# Patient Record
Sex: MALE | Race: WHITE | NOT HISPANIC OR LATINO | Employment: FULL TIME | URBAN - METROPOLITAN AREA
[De-identification: names, ages, dates, MRNs, and addresses within clinical notes are randomized per-mention and may not be internally consistent; named-entity substitution may affect disease eponyms.]

---

## 2023-03-04 PROBLEM — E66.811 CLASS 1 OBESITY WITH BODY MASS INDEX (BMI) OF 33.0 TO 33.9 IN ADULT: Status: ACTIVE | Noted: 2023-03-04

## 2023-03-04 PROBLEM — I10 ESSENTIAL HYPERTENSION: Status: ACTIVE | Noted: 2023-03-04

## 2023-03-04 PROBLEM — U07.1 COVID-19: Status: ACTIVE | Noted: 2023-03-04

## 2023-03-04 PROBLEM — J20.8 ACUTE BACTERIAL BRONCHITIS: Status: ACTIVE | Noted: 2023-03-04

## 2023-03-04 PROBLEM — R07.89 CHEST WALL PAIN: Status: ACTIVE | Noted: 2023-03-04

## 2023-03-04 PROBLEM — K64.9 HEMORRHOID: Status: ACTIVE | Noted: 2023-03-04

## 2023-03-04 PROBLEM — B02.9 SHINGLES: Status: ACTIVE | Noted: 2023-03-04

## 2023-03-04 PROBLEM — M79.646 FINGER PAIN: Status: ACTIVE | Noted: 2023-03-04

## 2023-03-04 PROBLEM — B96.89 ACUTE BACTERIAL BRONCHITIS: Status: ACTIVE | Noted: 2023-03-04

## 2023-03-04 PROBLEM — K21.9 GERD (GASTROESOPHAGEAL REFLUX DISEASE): Status: ACTIVE | Noted: 2023-03-04

## 2023-03-04 PROBLEM — R16.1 SPLENOMEGALY: Status: ACTIVE | Noted: 2023-03-04

## 2023-03-04 PROBLEM — E66.9 CLASS 1 OBESITY WITH BODY MASS INDEX (BMI) OF 33.0 TO 33.9 IN ADULT: Status: ACTIVE | Noted: 2023-03-04

## 2023-03-04 PROBLEM — J02.9 THROAT INFECTION: Status: ACTIVE | Noted: 2023-03-04

## 2023-03-04 PROBLEM — R43.2 LOSS OF TASTE: Status: ACTIVE | Noted: 2023-03-04

## 2023-03-04 PROBLEM — I71.20 THORACIC AORTIC ANEURYSM (CMS-HCC): Status: ACTIVE | Noted: 2023-03-04

## 2023-03-04 PROBLEM — R05.9 COUGH: Status: ACTIVE | Noted: 2023-03-04

## 2023-03-04 PROBLEM — Q76.6 ABNORMALITY OF RIB DETERMINED BY X-RAY: Status: ACTIVE | Noted: 2023-03-04

## 2023-03-04 PROBLEM — J32.9 SINUSITIS: Status: ACTIVE | Noted: 2023-03-04

## 2023-03-04 PROBLEM — D69.6 THROMBOCYTOPENIA (CMS-HCC): Status: ACTIVE | Noted: 2023-03-04

## 2023-03-04 PROBLEM — R91.1 PULMONARY NODULE: Status: ACTIVE | Noted: 2023-03-04

## 2023-03-04 RX ORDER — BENZONATATE 200 MG/1
200 CAPSULE ORAL 3 TIMES DAILY PRN
COMMUNITY
End: 2023-12-22 | Stop reason: WASHOUT

## 2023-03-04 RX ORDER — ZINC GLUCONATE 100 MG
TABLET ORAL
COMMUNITY

## 2023-03-04 RX ORDER — AMLODIPINE BESYLATE 2.5 MG/1
2.5 TABLET ORAL DAILY
COMMUNITY
End: 2023-03-24 | Stop reason: SDUPTHER

## 2023-03-16 ENCOUNTER — TELEPHONE (OUTPATIENT)
Dept: PRIMARY CARE | Facility: CLINIC | Age: 58
End: 2023-03-16
Payer: COMMERCIAL

## 2023-03-16 DIAGNOSIS — Z00.00 ROUTINE GENERAL MEDICAL EXAMINATION AT A HEALTH CARE FACILITY: Primary | ICD-10-CM

## 2023-03-16 NOTE — TELEPHONE ENCOUNTER
Pt. Left message stating he was told to call a wk. Prior to appt. To call and request lab orders be placed. Please advise.

## 2023-03-17 ENCOUNTER — LAB (OUTPATIENT)
Dept: LAB | Facility: LAB | Age: 58
End: 2023-03-17
Payer: COMMERCIAL

## 2023-03-17 DIAGNOSIS — Z00.00 ROUTINE GENERAL MEDICAL EXAMINATION AT A HEALTH CARE FACILITY: ICD-10-CM

## 2023-03-17 LAB
ALANINE AMINOTRANSFERASE (SGPT) (U/L) IN SER/PLAS: 51 U/L (ref 10–52)
ANION GAP IN SER/PLAS: 11 MMOL/L (ref 10–20)
CALCIUM (MG/DL) IN SER/PLAS: 9 MG/DL (ref 8.6–10.3)
CARBON DIOXIDE, TOTAL (MMOL/L) IN SER/PLAS: 25 MMOL/L (ref 21–32)
CHLORIDE (MMOL/L) IN SER/PLAS: 105 MMOL/L (ref 98–107)
CHOLESTEROL (MG/DL) IN SER/PLAS: 187 MG/DL (ref 0–199)
CHOLESTEROL IN HDL (MG/DL) IN SER/PLAS: 41.8 MG/DL
CHOLESTEROL/HDL RATIO: 4.5
CREATININE (MG/DL) IN SER/PLAS: 0.89 MG/DL (ref 0.5–1.3)
ERYTHROCYTE DISTRIBUTION WIDTH (RATIO) BY AUTOMATED COUNT: 14.2 % (ref 11.5–14.5)
ERYTHROCYTE MEAN CORPUSCULAR HEMOGLOBIN CONCENTRATION (G/DL) BY AUTOMATED: 33.4 G/DL (ref 32–36)
ERYTHROCYTE MEAN CORPUSCULAR VOLUME (FL) BY AUTOMATED COUNT: 88 FL (ref 80–100)
ERYTHROCYTES (10*6/UL) IN BLOOD BY AUTOMATED COUNT: 5.31 X10E12/L (ref 4.5–5.9)
GFR MALE: >90 ML/MIN/1.73M2
GLUCOSE (MG/DL) IN SER/PLAS: 93 MG/DL (ref 74–99)
HEMATOCRIT (%) IN BLOOD BY AUTOMATED COUNT: 46.7 % (ref 41–52)
HEMOGLOBIN (G/DL) IN BLOOD: 15.6 G/DL (ref 13.5–17.5)
HEMOGLOBIN A1C/HEMOGLOBIN TOTAL IN BLOOD: 4.9 %
LDL: 124 MG/DL (ref 0–99)
LEUKOCYTES (10*3/UL) IN BLOOD BY AUTOMATED COUNT: 8.7 X10E9/L (ref 4.4–11.3)
PLATELETS (10*3/UL) IN BLOOD AUTOMATED COUNT: 145 X10E9/L (ref 150–450)
POTASSIUM (MMOL/L) IN SER/PLAS: 4.3 MMOL/L (ref 3.5–5.3)
PROSTATE SPECIFIC AG (NG/ML) IN SER/PLAS: 0.4 NG/ML (ref 0–4)
SODIUM (MMOL/L) IN SER/PLAS: 137 MMOL/L (ref 136–145)
TRIGLYCERIDE (MG/DL) IN SER/PLAS: 107 MG/DL (ref 0–149)
UREA NITROGEN (MG/DL) IN SER/PLAS: 16 MG/DL (ref 6–23)
VLDL: 21 MG/DL (ref 0–40)

## 2023-03-17 PROCEDURE — 80061 LIPID PANEL: CPT

## 2023-03-17 PROCEDURE — 85027 COMPLETE CBC AUTOMATED: CPT

## 2023-03-17 PROCEDURE — 84153 ASSAY OF PSA TOTAL: CPT

## 2023-03-17 PROCEDURE — 36415 COLL VENOUS BLD VENIPUNCTURE: CPT

## 2023-03-17 PROCEDURE — 84460 ALANINE AMINO (ALT) (SGPT): CPT

## 2023-03-17 PROCEDURE — 83036 HEMOGLOBIN GLYCOSYLATED A1C: CPT

## 2023-03-17 PROCEDURE — 80048 BASIC METABOLIC PNL TOTAL CA: CPT

## 2023-03-17 ASSESSMENT — ENCOUNTER SYMPTOMS
PALPITATIONS: 0
NECK PAIN: 1
ORTHOPNEA: 0
SWEATS: 1
BLURRED VISION: 0
HEADACHES: 0
HYPERTENSION: 1
SHORTNESS OF BREATH: 0
PND: 0

## 2023-03-24 ENCOUNTER — OFFICE VISIT (OUTPATIENT)
Dept: PRIMARY CARE | Facility: CLINIC | Age: 58
End: 2023-03-24
Payer: COMMERCIAL

## 2023-03-24 ENCOUNTER — LAB (OUTPATIENT)
Dept: LAB | Facility: LAB | Age: 58
End: 2023-03-24
Payer: COMMERCIAL

## 2023-03-24 VITALS
SYSTOLIC BLOOD PRESSURE: 144 MMHG | TEMPERATURE: 98 F | WEIGHT: 264.8 LBS | BODY MASS INDEX: 33.1 KG/M2 | DIASTOLIC BLOOD PRESSURE: 90 MMHG

## 2023-03-24 DIAGNOSIS — G47.33 OSA (OBSTRUCTIVE SLEEP APNEA): ICD-10-CM

## 2023-03-24 DIAGNOSIS — R91.1 PULMONARY NODULE: ICD-10-CM

## 2023-03-24 DIAGNOSIS — K21.9 GASTROESOPHAGEAL REFLUX DISEASE, UNSPECIFIED WHETHER ESOPHAGITIS PRESENT: ICD-10-CM

## 2023-03-24 DIAGNOSIS — I10 ESSENTIAL HYPERTENSION: Primary | ICD-10-CM

## 2023-03-24 DIAGNOSIS — D69.6 THROMBOCYTOPENIA (CMS-HCC): ICD-10-CM

## 2023-03-24 DIAGNOSIS — K40.90 NON-RECURRENT UNILATERAL INGUINAL HERNIA WITHOUT OBSTRUCTION OR GANGRENE: ICD-10-CM

## 2023-03-24 DIAGNOSIS — E78.5 HYPERLIPIDEMIA, UNSPECIFIED HYPERLIPIDEMIA TYPE: ICD-10-CM

## 2023-03-24 DIAGNOSIS — I71.20 THORACIC AORTIC ANEURYSM WITHOUT RUPTURE, UNSPECIFIED PART (CMS-HCC): ICD-10-CM

## 2023-03-24 PROBLEM — B96.89 ACUTE BACTERIAL BRONCHITIS: Status: RESOLVED | Noted: 2023-03-04 | Resolved: 2023-03-24

## 2023-03-24 PROBLEM — J20.8 ACUTE BACTERIAL BRONCHITIS: Status: RESOLVED | Noted: 2023-03-04 | Resolved: 2023-03-24

## 2023-03-24 PROBLEM — R43.2 LOSS OF TASTE: Status: RESOLVED | Noted: 2023-03-04 | Resolved: 2023-03-24

## 2023-03-24 PROBLEM — R05.9 COUGH: Status: RESOLVED | Noted: 2023-03-04 | Resolved: 2023-03-24

## 2023-03-24 LAB — PLATELETS (10*3/UL) IN BLOOD AUTOMATED COUNT: 174 X10E9/L (ref 150–450)

## 2023-03-24 PROCEDURE — 36415 COLL VENOUS BLD VENIPUNCTURE: CPT

## 2023-03-24 PROCEDURE — 3080F DIAST BP >= 90 MM HG: CPT | Performed by: INTERNAL MEDICINE

## 2023-03-24 PROCEDURE — 1036F TOBACCO NON-USER: CPT | Performed by: INTERNAL MEDICINE

## 2023-03-24 PROCEDURE — 3077F SYST BP >= 140 MM HG: CPT | Performed by: INTERNAL MEDICINE

## 2023-03-24 PROCEDURE — 85049 AUTOMATED PLATELET COUNT: CPT

## 2023-03-24 PROCEDURE — 99214 OFFICE O/P EST MOD 30 MIN: CPT | Performed by: INTERNAL MEDICINE

## 2023-03-24 RX ORDER — ROSUVASTATIN CALCIUM 5 MG/1
5 TABLET, COATED ORAL DAILY
Qty: 90 TABLET | Refills: 3 | Status: SHIPPED | OUTPATIENT
Start: 2023-03-24 | End: 2023-06-26

## 2023-03-24 RX ORDER — AMLODIPINE BESYLATE 5 MG/1
5 TABLET ORAL DAILY
Qty: 90 TABLET | Refills: 3 | Status: SHIPPED | OUTPATIENT
Start: 2023-03-24 | End: 2023-06-26

## 2023-03-24 NOTE — PATIENT INSTRUCTIONS
Scheduled to see a surgeon increase amlodipine to 5 mg and begin crestor.  Please see the surgeon as we discussed.  Lets get back together in about 3 months

## 2023-03-24 NOTE — PROGRESS NOTES
Subjective   Patient ID: Wellington Meredith is a 57 y.o. male who presents for Follow-up (Blood work).    Hypertension  This is a recurrent problem. The current episode started more than 1 year ago. The problem has been waxing and waning since onset. The problem is controlled. Associated symptoms include malaise/fatigue, neck pain and sweats. Pertinent negatives include no anxiety, blurred vision, chest pain, headaches, orthopnea, palpitations, peripheral edema, PND or shortness of breath. Risk factors for coronary artery disease include family history, obesity and stress. There are no compliance problems.         Overall well   #1 c/o soreness, rt groin after increased coughing x a few weeks.  Increased w/ lifting.   #2 TAA  #3 HTN- at home 130s-150s.  Little exercise.  No CP,SOB    Review of Systems   Constitutional:  Positive for malaise/fatigue.   Eyes:  Negative for blurred vision.   Respiratory:  Negative for shortness of breath.    Cardiovascular:  Negative for chest pain, palpitations, orthopnea and PND.   Musculoskeletal:  Positive for neck pain.   Neurological:  Negative for headaches.   All other systems reviewed and are negative.      Objective   /90   Temp 36.7 °C (98 °F)   Wt 120 kg (264 lb 12.8 oz)   BMI 33.10 kg/m²     Physical Exam    Lab Results   Component Value Date    WBC 8.7 03/17/2023    HGB 15.6 03/17/2023    HCT 46.7 03/17/2023     03/24/2023    CHOL 187 03/17/2023    TRIG 107 03/17/2023    HDL 41.8 03/17/2023    ALT 51 03/17/2023    AST 20 09/05/2018     03/17/2023    K 4.3 03/17/2023     03/17/2023    CREATININE 0.89 03/17/2023    BUN 16 03/17/2023    CO2 25 03/17/2023    TSH 1.11 09/05/2018    PSA 0.40 03/17/2023    HGBA1C 4.9 03/17/2023     The 10-year ASCVD risk score (Jaz SMITH, et al., 2019) is: 12.1%    Values used to calculate the score:      Age: 57 years      Sex: Male      Is Non- : No      Diabetic: No      Tobacco smoker: No       "Systolic Blood Pressure: 144 mmHg      Is BP treated: Yes      HDL Cholesterol: 41.8 mg/dL      Total Cholesterol: 187 mg/dL       === 05/12/22 ===    CT CHEST WO IV CONTRAST    - Impression -  1.  Stable tiny left lower lobe nodule. No further imaging follow-up  needed for this finding     Assessment/Plan   Problem List Items Addressed This Visit          Respiratory    Pulmonary nodule     Stable x >2yrs            Circulatory    Essential hypertension - Primary    Relevant Medications    amLODIPine (Norvasc) 5 mg tablet    Thoracic aortic aneurysm       Digestive    GERD (gastroesophageal reflux disease)       Hematologic    Thrombocytopenia (CMS/HCC)    Relevant Orders    Platelet count (Completed)     Other Visit Diagnoses       CHRISSY (obstructive sleep apnea)        Relevant Orders    Home sleep apnea test (HSAT)    Hyperlipidemia, unspecified hyperlipidemia type        Relevant Medications    rosuvastatin (Crestor) 5 mg tablet    Non-recurrent unilateral inguinal hernia without obstruction or gangrene        Relevant Orders    Referral to General Surgery            #1 rt IH- reviewed.  C/s GS  #2 thoracic aortic aneurysm- reviewed at great length. Recommend follow-up CT scan ~5/23. Reviewed need for BP control. Reviewed importance with patient  #3 mild, nonspecific mediastinal adenopathy- resolved on  follow-up CTs  #4 mild splenomegaly-   resolved on  follow-up CTs  #5 hypertension- too high.  Increase amlodipine to 5 mg.  Low salt diet reviewed, exercise.   #6 finger- resolved, follow-up hand orthopedist PRN  #7 GERD- controlled. Status post endoscopy 10/10/18. I do not have results. Patient told \"all okay \"  #8 thrombocytopenia- retest in plt tube.  reviewed  #9 family history of diabetes- excellent a1c. Diet/exercise reviewed.     #10 ?CHRISSY- +snoring, HTN, +fatigue.  ---> sleep study  #11 lipids- ldl too high, increase ASCVD risk.  Reviewed.  Begin Crestor.  Discussed risk of med.  F/up labs 2 mths.   #11 " colon polyps- 22--> 2027

## 2023-03-26 PROBLEM — K64.9 HEMORRHOID: Status: RESOLVED | Noted: 2023-03-04 | Resolved: 2023-03-26

## 2023-03-26 ASSESSMENT — ENCOUNTER SYMPTOMS
SWEATS: 1
ORTHOPNEA: 0
HEADACHES: 0
NECK PAIN: 1
SHORTNESS OF BREATH: 0
PND: 0
PALPITATIONS: 0
BLURRED VISION: 0
HYPERTENSION: 1

## 2023-04-13 ENCOUNTER — TELEMEDICINE (OUTPATIENT)
Dept: PRIMARY CARE | Facility: CLINIC | Age: 58
End: 2023-04-13
Payer: COMMERCIAL

## 2023-04-13 DIAGNOSIS — J01.41 ACUTE RECURRENT PANSINUSITIS: Primary | ICD-10-CM

## 2023-04-13 PROCEDURE — 99442 PR PHYS/QHP TELEPHONE EVALUATION 11-20 MIN: CPT | Performed by: NURSE PRACTITIONER

## 2023-04-13 RX ORDER — AMOXICILLIN AND CLAVULANATE POTASSIUM 875; 125 MG/1; MG/1
875 TABLET, FILM COATED ORAL 2 TIMES DAILY
Qty: 20 TABLET | Refills: 0 | Status: SHIPPED | OUTPATIENT
Start: 2023-04-13 | End: 2023-04-23

## 2023-04-13 ASSESSMENT — ENCOUNTER SYMPTOMS
DIZZINESS: 0
SORE THROAT: 0
SHORTNESS OF BREATH: 0
HEADACHES: 0
SINUS PRESSURE: 1
FEVER: 0
CHILLS: 0
COUGH: 0

## 2023-04-13 ASSESSMENT — LIFESTYLE VARIABLES: HISTORY_OF_SMOKING: I SMOKED IN THE PAST, BUT QUIT

## 2023-04-13 NOTE — PATIENT INSTRUCTIONS
Add Mucinex PLAIN to your regimen and restart the OTC PLAIN Claritin - the goal is to reduce the amount of drainage being made as well as thin it out so you can get rid of it.  The antibiotic will take care of any secondary bacterial infection that may be present.  If the symptoms return, be sure to d/w Dr. Elizondo the possibility of allergic rhinitis, etc.   A CT scan of the sinuses can also identify any infection that does not clear.  We talked about symptoms that need to go to the ED - sudden or acute changes in headache, breathing, etc.  No decongestants.  Lots of fluids

## 2023-04-13 NOTE — PROGRESS NOTES
Subjective   Patient ID: Wellington Meredith is a 57 y.o. male who presents for No chief complaint on file..  Ymptoms have been on and off for several months ( December).  Most recently 3 weeks of sinus fullness, congestion and now discolored discharge.  No known environmental allergies, very remote smoker.  Using Nasacort per PCP, occasional Sudafed, symptoms are continuing to worsen.    Sinusitis  Associated symptoms include congestion and sinus pressure. Pertinent negatives include no chills, coughing, headaches, shortness of breath or sore throat.       Review of Systems   Constitutional:  Negative for chills and fever.   HENT:  Positive for congestion, postnasal drip and sinus pressure. Negative for sore throat.    Respiratory:  Negative for cough and shortness of breath.    Cardiovascular:  Negative for chest pain and leg swelling.   Neurological:  Negative for dizziness and headaches.       Objective   Physical Exam  Constitutional:       Appearance: Normal appearance.   HENT:      Nose:      Comments: Nasal congestion evident  Neurological:      General: No focal deficit present.      Mental Status: He is alert.         Assessment/Plan

## 2023-06-26 ENCOUNTER — OFFICE VISIT (OUTPATIENT)
Dept: PRIMARY CARE | Facility: CLINIC | Age: 58
End: 2023-06-26
Payer: COMMERCIAL

## 2023-06-26 VITALS
SYSTOLIC BLOOD PRESSURE: 140 MMHG | DIASTOLIC BLOOD PRESSURE: 90 MMHG | WEIGHT: 267 LBS | TEMPERATURE: 98.3 F | BODY MASS INDEX: 33.37 KG/M2

## 2023-06-26 DIAGNOSIS — I10 ESSENTIAL HYPERTENSION: Primary | ICD-10-CM

## 2023-06-26 DIAGNOSIS — I71.20 THORACIC AORTIC ANEURYSM WITHOUT RUPTURE, UNSPECIFIED PART (CMS-HCC): ICD-10-CM

## 2023-06-26 DIAGNOSIS — K64.9 HEMORRHOIDS, UNSPECIFIED HEMORRHOID TYPE: ICD-10-CM

## 2023-06-26 DIAGNOSIS — E78.5 HYPERLIPIDEMIA, UNSPECIFIED HYPERLIPIDEMIA TYPE: ICD-10-CM

## 2023-06-26 PROCEDURE — 3077F SYST BP >= 140 MM HG: CPT | Performed by: INTERNAL MEDICINE

## 2023-06-26 PROCEDURE — 1036F TOBACCO NON-USER: CPT | Performed by: INTERNAL MEDICINE

## 2023-06-26 PROCEDURE — 99214 OFFICE O/P EST MOD 30 MIN: CPT | Performed by: INTERNAL MEDICINE

## 2023-06-26 PROCEDURE — 3080F DIAST BP >= 90 MM HG: CPT | Performed by: INTERNAL MEDICINE

## 2023-06-26 RX ORDER — ATORVASTATIN CALCIUM 10 MG/1
10 TABLET, FILM COATED ORAL DAILY
Qty: 30 TABLET | Refills: 5 | Status: SHIPPED | OUTPATIENT
Start: 2023-06-26 | End: 2023-06-29 | Stop reason: SDUPTHER

## 2023-06-26 RX ORDER — AMLODIPINE BESYLATE 10 MG/1
10 TABLET ORAL DAILY
Qty: 30 TABLET | Refills: 5 | Status: SHIPPED | OUTPATIENT
Start: 2023-06-26 | End: 2023-06-29 | Stop reason: SDUPTHER

## 2023-06-26 RX ORDER — HYDROCORTISONE ACETATE 25 MG/1
25 SUPPOSITORY RECTAL 2 TIMES DAILY PRN
Qty: 30 SUPPOSITORY | Refills: 1 | Status: SHIPPED | OUTPATIENT
Start: 2023-06-26 | End: 2023-06-29 | Stop reason: SDUPTHER

## 2023-06-26 ASSESSMENT — ENCOUNTER SYMPTOMS
HYPERTENSION: 1
NECK PAIN: 1
SHORTNESS OF BREATH: 0
BLURRED VISION: 0
HEADACHES: 0
SWEATS: 1
PALPITATIONS: 0
PND: 0
ORTHOPNEA: 0

## 2023-06-26 ASSESSMENT — PATIENT HEALTH QUESTIONNAIRE - PHQ9
1. LITTLE INTEREST OR PLEASURE IN DOING THINGS: NOT AT ALL
2. FEELING DOWN, DEPRESSED OR HOPELESS: NOT AT ALL
SUM OF ALL RESPONSES TO PHQ9 QUESTIONS 1 AND 2: 0

## 2023-06-26 NOTE — PROGRESS NOTES
Subjective   Patient ID: Wellington Meredith is a 57 y.o. male who presents for 3 month fu.    Hypertension  This is a recurrent problem. The current episode started more than 1 year ago. The problem has been waxing and waning since onset. The problem is controlled. Associated symptoms include malaise/fatigue, neck pain and sweats. Pertinent negatives include no anxiety, blurred vision, chest pain, headaches, orthopnea, palpitations, peripheral edema, PND or shortness of breath. Risk factors for coronary artery disease include family history, obesity and stress. There are no compliance problems.         Overall well   #1 c/o soreness, rt groin after increased coughing x a few weeks.  Increased w/ lifting.   #2 TAA  #3 HTN- at home 130s-140/90s.  Little exercise.  No CP,SOB    Did not start crestor--> $$    Does complain of hemorrhoids over the past few weeks.  No bleeding.  Painful lump.  Slowly improving.    Review of Systems   Constitutional:  Positive for malaise/fatigue.   Eyes:  Negative for blurred vision.   Respiratory:  Negative for shortness of breath.    Cardiovascular:  Negative for chest pain, palpitations, orthopnea and PND.   Musculoskeletal:  Positive for neck pain.   Neurological:  Negative for headaches.   All other systems reviewed and are negative.      Objective   /90   Temp 36.8 °C (98.3 °F)   Wt 121 kg (267 lb)   BMI 33.37 kg/m²     Physical Exam    Lab Results   Component Value Date    WBC 8.7 03/17/2023    HGB 15.6 03/17/2023    HCT 46.7 03/17/2023     03/24/2023    CHOL 187 03/17/2023    TRIG 107 03/17/2023    HDL 41.8 03/17/2023    ALT 51 03/17/2023    AST 20 09/05/2018     03/17/2023    K 4.3 03/17/2023     03/17/2023    CREATININE 0.89 03/17/2023    BUN 16 03/17/2023    CO2 25 03/17/2023    TSH 1.11 09/05/2018    PSA 0.40 03/17/2023    HGBA1C 4.9 03/17/2023     The 10-year ASCVD risk score (Jaz SMITH, et al., 2019) is: 11.5%    Values used to calculate the score:     "  Age: 57 years      Sex: Male      Is Non- : No      Diabetic: No      Tobacco smoker: No      Systolic Blood Pressure: 140 mmHg      Is BP treated: Yes      HDL Cholesterol: 41.8 mg/dL      Total Cholesterol: 187 mg/dL       === 05/12/22 ===    CT CHEST WO IV CONTRAST    - Impression -  1.  Stable tiny left lower lobe nodule. No further imaging follow-up  needed for this finding     Assessment/Plan   Problem List Items Addressed This Visit    None    #1 rt IH- reviewed.  C/s GS  #2 thoracic aortic aneurysm- reviewed at great length. Recommend follow-up CT scan next visit. Reviewed need for BP control. Reviewed importance with patient  #3 mild, nonspecific mediastinal adenopathy- resolved on  follow-up CTs  #4 mild splenomegaly-   resolved on  follow-up CTs  #5 hypertension- remains too high.  Increase amlodipine to 5 mg.  Low salt diet reviewed, exercise.   #6 finger- resolved, follow-up hand orthopedist PRN  #7 GERD- controlled. Status post endoscopy 10/10/18. I do not have results. Patient told \"all okay \"  #8 thrombocytopenia- retest in plt tube.  reviewed  #9 family history of diabetes- excellent a1c. Diet/exercise reviewed.     #10 ?CHRISSY- +snoring, HTN, +fatigue.  ---> sleep study.  Reviewed importance of rx.  #11 lipids- ldl too high, increase ASCVD risk.  Reviewed.  Begin atorvastin.  Discussed risk of med.  F/up labs 2 mths.   #12 colon polyps- 22--> 2027  #13  Hemorrhoids-reviewed.    "

## 2023-06-27 PROBLEM — J02.9 THROAT INFECTION: Status: RESOLVED | Noted: 2023-03-04 | Resolved: 2023-06-27

## 2023-06-27 PROBLEM — M79.646 FINGER PAIN: Status: RESOLVED | Noted: 2023-03-04 | Resolved: 2023-06-27

## 2023-06-27 PROBLEM — R07.89 CHEST WALL PAIN: Status: RESOLVED | Noted: 2023-03-04 | Resolved: 2023-06-27

## 2023-06-27 PROBLEM — U07.1 COVID-19: Status: RESOLVED | Noted: 2023-03-04 | Resolved: 2023-06-27

## 2023-06-27 PROBLEM — B02.9 SHINGLES: Status: RESOLVED | Noted: 2023-03-04 | Resolved: 2023-06-27

## 2023-06-27 PROBLEM — Q76.6 ABNORMALITY OF RIB DETERMINED BY X-RAY: Status: RESOLVED | Noted: 2023-03-04 | Resolved: 2023-06-27

## 2023-06-27 PROBLEM — R16.1 SPLENOMEGALY: Status: RESOLVED | Noted: 2023-03-04 | Resolved: 2023-06-27

## 2023-06-27 PROBLEM — J32.9 SINUSITIS: Status: RESOLVED | Noted: 2023-03-04 | Resolved: 2023-06-27

## 2023-06-28 ENCOUNTER — TELEPHONE (OUTPATIENT)
Dept: PRIMARY CARE | Facility: CLINIC | Age: 58
End: 2023-06-28
Payer: COMMERCIAL

## 2023-06-28 NOTE — TELEPHONE ENCOUNTER
Pt's wife called asking for the pt's 3 recent scripts to be sent to [facility] in [place], [place] and NOT to [facility] right now.  She will be using a [employer] coupon.  Thoses are his Atorvastatin, Amlodipine, ad Hydrocortisone Suppository.

## 2023-06-29 DIAGNOSIS — I10 ESSENTIAL HYPERTENSION: ICD-10-CM

## 2023-06-29 DIAGNOSIS — E78.5 HYPERLIPIDEMIA, UNSPECIFIED HYPERLIPIDEMIA TYPE: ICD-10-CM

## 2023-06-29 DIAGNOSIS — K64.9 HEMORRHOIDS, UNSPECIFIED HEMORRHOID TYPE: ICD-10-CM

## 2023-06-29 RX ORDER — AMLODIPINE BESYLATE 10 MG/1
10 TABLET ORAL DAILY
Qty: 30 TABLET | Refills: 5 | Status: SHIPPED | OUTPATIENT
Start: 2023-06-29 | End: 2024-01-26 | Stop reason: WASHOUT

## 2023-06-29 RX ORDER — HYDROCORTISONE ACETATE 25 MG/1
25 SUPPOSITORY RECTAL 2 TIMES DAILY PRN
Qty: 30 SUPPOSITORY | Refills: 1 | Status: SHIPPED | OUTPATIENT
Start: 2023-06-29 | End: 2024-06-28

## 2023-06-29 RX ORDER — ATORVASTATIN CALCIUM 10 MG/1
10 TABLET, FILM COATED ORAL DAILY
Qty: 30 TABLET | Refills: 5 | Status: SHIPPED | OUTPATIENT
Start: 2023-06-29 | End: 2024-03-21

## 2023-07-18 DIAGNOSIS — J32.9 SINUSITIS, UNSPECIFIED CHRONICITY, UNSPECIFIED LOCATION: ICD-10-CM

## 2023-07-18 RX ORDER — CEFDINIR 300 MG/1
300 CAPSULE ORAL 2 TIMES DAILY
Qty: 10 CAPSULE | Refills: 0 | Status: SHIPPED | OUTPATIENT
Start: 2023-07-18 | End: 2023-07-23

## 2023-09-25 ENCOUNTER — LAB (OUTPATIENT)
Dept: LAB | Facility: LAB | Age: 58
End: 2023-09-25
Payer: COMMERCIAL

## 2023-09-25 DIAGNOSIS — I10 ESSENTIAL HYPERTENSION: ICD-10-CM

## 2023-09-25 DIAGNOSIS — E78.5 HYPERLIPIDEMIA, UNSPECIFIED HYPERLIPIDEMIA TYPE: ICD-10-CM

## 2023-09-25 LAB
ALANINE AMINOTRANSFERASE (SGPT) (U/L) IN SER/PLAS: 42 U/L (ref 10–52)
ANION GAP IN SER/PLAS: 13 MMOL/L (ref 10–20)
CALCIUM (MG/DL) IN SER/PLAS: 9.5 MG/DL (ref 8.6–10.3)
CARBON DIOXIDE, TOTAL (MMOL/L) IN SER/PLAS: 27 MMOL/L (ref 21–32)
CHLORIDE (MMOL/L) IN SER/PLAS: 103 MMOL/L (ref 98–107)
CHOLESTEROL (MG/DL) IN SER/PLAS: 187 MG/DL (ref 0–199)
CHOLESTEROL IN HDL (MG/DL) IN SER/PLAS: 47.5 MG/DL
CHOLESTEROL/HDL RATIO: 3.9
CREATININE (MG/DL) IN SER/PLAS: 1.08 MG/DL (ref 0.5–1.3)
GFR MALE: 79 ML/MIN/1.73M2
GLUCOSE (MG/DL) IN SER/PLAS: 96 MG/DL (ref 74–99)
LDL: 117 MG/DL (ref 0–99)
POTASSIUM (MMOL/L) IN SER/PLAS: 4.6 MMOL/L (ref 3.5–5.3)
SODIUM (MMOL/L) IN SER/PLAS: 138 MMOL/L (ref 136–145)
TRIGLYCERIDE (MG/DL) IN SER/PLAS: 114 MG/DL (ref 0–149)
UREA NITROGEN (MG/DL) IN SER/PLAS: 22 MG/DL (ref 6–23)
VLDL: 23 MG/DL (ref 0–40)

## 2023-09-25 PROCEDURE — 84460 ALANINE AMINO (ALT) (SGPT): CPT

## 2023-09-25 PROCEDURE — 80048 BASIC METABOLIC PNL TOTAL CA: CPT

## 2023-09-25 PROCEDURE — 80061 LIPID PANEL: CPT

## 2023-09-25 PROCEDURE — 36415 COLL VENOUS BLD VENIPUNCTURE: CPT

## 2023-09-26 ENCOUNTER — OFFICE VISIT (OUTPATIENT)
Dept: PRIMARY CARE | Facility: CLINIC | Age: 58
End: 2023-09-26
Payer: COMMERCIAL

## 2023-09-26 VITALS
SYSTOLIC BLOOD PRESSURE: 126 MMHG | TEMPERATURE: 97.7 F | WEIGHT: 266 LBS | BODY MASS INDEX: 33.25 KG/M2 | DIASTOLIC BLOOD PRESSURE: 84 MMHG

## 2023-09-26 DIAGNOSIS — J42 CHRONIC BRONCHITIS, UNSPECIFIED CHRONIC BRONCHITIS TYPE (MULTI): ICD-10-CM

## 2023-09-26 DIAGNOSIS — I71.20 THORACIC AORTIC ANEURYSM WITHOUT RUPTURE, UNSPECIFIED PART (CMS-HCC): ICD-10-CM

## 2023-09-26 DIAGNOSIS — G47.33 OSA (OBSTRUCTIVE SLEEP APNEA): ICD-10-CM

## 2023-09-26 DIAGNOSIS — Z23 ENCOUNTER FOR IMMUNIZATION: ICD-10-CM

## 2023-09-26 DIAGNOSIS — R07.89 ATYPICAL CHEST PAIN: Primary | ICD-10-CM

## 2023-09-26 PROCEDURE — 90471 IMMUNIZATION ADMIN: CPT | Performed by: INTERNAL MEDICINE

## 2023-09-26 PROCEDURE — 99214 OFFICE O/P EST MOD 30 MIN: CPT | Performed by: INTERNAL MEDICINE

## 2023-09-26 PROCEDURE — 93000 ELECTROCARDIOGRAM COMPLETE: CPT | Performed by: INTERNAL MEDICINE

## 2023-09-26 PROCEDURE — 90686 IIV4 VACC NO PRSV 0.5 ML IM: CPT | Performed by: INTERNAL MEDICINE

## 2023-09-26 PROCEDURE — 90472 IMMUNIZATION ADMIN EACH ADD: CPT | Performed by: INTERNAL MEDICINE

## 2023-09-26 PROCEDURE — 90715 TDAP VACCINE 7 YRS/> IM: CPT | Performed by: INTERNAL MEDICINE

## 2023-09-26 PROCEDURE — 1036F TOBACCO NON-USER: CPT | Performed by: INTERNAL MEDICINE

## 2023-09-26 PROCEDURE — 3074F SYST BP LT 130 MM HG: CPT | Performed by: INTERNAL MEDICINE

## 2023-09-26 PROCEDURE — 3079F DIAST BP 80-89 MM HG: CPT | Performed by: INTERNAL MEDICINE

## 2023-09-26 ASSESSMENT — ENCOUNTER SYMPTOMS
PALPITATIONS: 0
NECK PAIN: 1
HYPERTENSION: 1
HEADACHES: 0
PND: 0
SWEATS: 1
BLURRED VISION: 0
ORTHOPNEA: 0
SHORTNESS OF BREATH: 0

## 2023-09-26 ASSESSMENT — PATIENT HEALTH QUESTIONNAIRE - PHQ9
SUM OF ALL RESPONSES TO PHQ9 QUESTIONS 1 AND 2: 0
2. FEELING DOWN, DEPRESSED OR HOPELESS: NOT AT ALL
1. LITTLE INTEREST OR PLEASURE IN DOING THINGS: NOT AT ALL

## 2023-09-26 NOTE — PROGRESS NOTES
Subjective   Patient ID: Wellington Meredith is a 57 y.o. male who presents for Follow-up and Flu Vaccine.    Hypertension  This is a recurrent problem. The current episode started more than 1 year ago. The problem has been waxing and waning since onset. The problem is controlled. Associated symptoms include malaise/fatigue, neck pain and sweats. Pertinent negatives include no anxiety, blurred vision, chest pain, headaches, orthopnea, palpitations, peripheral edema, PND or shortness of breath. Risk factors for coronary artery disease include family history, obesity and stress. There are no compliance problems.         Overall well   Describes a few episodes of vague chest discomfort.  Nonexertional.  No associated nausea vomiting diaphoresis.  No radiation.  Unable to clearly describe.  None in several weeks.    #1  Inguinal hernia-did not see surgeon.  Minimal discomfort at this point   #2 TAA  #3 HTN- at home 114-120s/80s    Did not start atorvastatin.  Unclear why.  Does complain of hemorrhoids over the past few weeks.  No bleeding.  Painful lump.  Slowly improving.    Review of Systems   Constitutional:  Positive for malaise/fatigue.   Eyes:  Negative for blurred vision.   Respiratory:  Negative for shortness of breath.    Cardiovascular:  Negative for chest pain, palpitations, orthopnea and PND.   Musculoskeletal:  Positive for neck pain.   Neurological:  Negative for headaches.   All other systems reviewed and are negative.      Objective   /84   Temp 36.5 °C (97.7 °F)   Wt 121 kg (266 lb)   BMI 33.25 kg/m²     Physical Exam    Lab Results   Component Value Date    WBC 8.7 03/17/2023    HGB 15.6 03/17/2023    HCT 46.7 03/17/2023     03/24/2023    CHOL 187 09/25/2023    TRIG 114 09/25/2023    HDL 47.5 09/25/2023    ALT 42 09/25/2023    AST 20 09/05/2018     09/25/2023    K 4.6 09/25/2023     09/25/2023    CREATININE 1.08 09/25/2023    BUN 22 09/25/2023    CO2 27 09/25/2023    TSH 1.11  "09/05/2018    PSA 0.40 03/17/2023    HGBA1C 4.9 03/17/2023     The 10-year ASCVD risk score (Jaz DK, et al., 2019) is: 8.8%    Values used to calculate the score:      Age: 57 years      Sex: Male      Is Non- : No      Diabetic: No      Tobacco smoker: No      Systolic Blood Pressure: 126 mmHg      Is BP treated: Yes      HDL Cholesterol: 47.5 mg/dL      Total Cholesterol: 187 mg/dL       === 05/12/22 ===    CT CHEST WO IV CONTRAST    - Impression -  1.  Stable tiny left lower lobe nodule. No further imaging follow-up  needed for this finding     Assessment/Plan   Problem List Items Addressed This Visit    None  Visit Diagnoses       Atypical chest pain    -  Primary    Relevant Orders    Echocardiogram stress test    ECG 12 lead (Clinic Performed)    Encounter for immunization        Relevant Orders    Flu vaccine (IIV4) age 6 months and greater, preservative free (Completed)    CHRISSY (obstructive sleep apnea)        Relevant Orders    Home sleep apnea test (HSAT)            #1 rt IH- reviewed.  C/s GS, reviewed.   #2 thoracic aortic aneurysm- reviewed at great length. Recommend follow-up CT scan.. Reviewed need for BP control. Reviewed importance with patient  #3 mild, nonspecific mediastinal adenopathy- resolved on  follow-up CTs  #4 mild splenomegaly-   resolved on  follow-up CTs  #5 hypertension-  good.  ? Why better  #6 finger- resolved, follow-up hand orthopedist PRN  #7 GERD- controlled. Status post endoscopy 10/10/18. I do not have results. Patient told \"all okay \"  #8 thrombocytopenia- good on retest.  #9 family history of diabetes- excellent a1c. Diet/exercise reviewed.     #10 ?CHRISSY- +snoring, HTN, +fatigue.  ---> sleep study.  Reviewed importance of rx.  #11 lipids- ldl better but still high, modest increase ASCVD risk.  Reviewed.  Begin atorvastin given family history, he will consider.  Discussed risk of med.  F/up labs 2 mths.   #12 colon polyps- 22--> 2027  #13  " Hemorrhoids-reviewed.  #14 episodic chest pain.  None in several weeks.  Atypical and nonexertional.  Does have family history of CAD.  Suddenly changed EKG.  We will check stress echo.  Asked patient to go to ED any progressive symptoms.

## 2023-10-10 PROBLEM — J42 CHRONIC BRONCHITIS, UNSPECIFIED CHRONIC BRONCHITIS TYPE (MULTI): Status: ACTIVE | Noted: 2023-10-10

## 2023-11-08 ENCOUNTER — ANCILLARY PROCEDURE (OUTPATIENT)
Dept: RADIOLOGY | Facility: CLINIC | Age: 58
End: 2023-11-08
Payer: COMMERCIAL

## 2023-11-08 DIAGNOSIS — I71.20 THORACIC AORTIC ANEURYSM WITHOUT RUPTURE, UNSPECIFIED PART (CMS-HCC): ICD-10-CM

## 2023-11-08 PROCEDURE — 71250 CT THORAX DX C-: CPT

## 2023-11-08 PROCEDURE — 71250 CT THORAX DX C-: CPT | Performed by: RADIOLOGY

## 2023-11-09 ENCOUNTER — HOSPITAL ENCOUNTER (OUTPATIENT)
Dept: CARDIOLOGY | Facility: CLINIC | Age: 58
Discharge: HOME | End: 2023-11-09
Payer: COMMERCIAL

## 2023-11-09 DIAGNOSIS — R07.89 ATYPICAL CHEST PAIN: ICD-10-CM

## 2023-11-09 DIAGNOSIS — I71.21 ANEURYSM OF THE ASCENDING AORTA, WITHOUT RUPTURE (CMS-HCC): ICD-10-CM

## 2023-11-09 DIAGNOSIS — I71.20 THORACIC AORTIC ANEURYSM WITHOUT RUPTURE, UNSPECIFIED PART (CMS-HCC): ICD-10-CM

## 2023-11-09 PROCEDURE — 93306 TTE W/DOPPLER COMPLETE: CPT

## 2023-11-09 PROCEDURE — 93306 TTE W/DOPPLER COMPLETE: CPT | Performed by: INTERNAL MEDICINE

## 2023-11-13 ENCOUNTER — OFFICE VISIT (OUTPATIENT)
Dept: SURGERY | Facility: CLINIC | Age: 58
End: 2023-11-13
Payer: COMMERCIAL

## 2023-11-13 VITALS — BODY MASS INDEX: 33.72 KG/M2 | WEIGHT: 271.2 LBS | HEIGHT: 75 IN

## 2023-11-13 DIAGNOSIS — K40.90 UNILATERAL INGUINAL HERNIA WITHOUT OBSTRUCTION OR GANGRENE, RECURRENCE NOT SPECIFIED: Primary | ICD-10-CM

## 2023-11-13 PROCEDURE — 3079F DIAST BP 80-89 MM HG: CPT | Performed by: SURGERY

## 2023-11-13 PROCEDURE — 3074F SYST BP LT 130 MM HG: CPT | Performed by: SURGERY

## 2023-11-13 PROCEDURE — 99203 OFFICE O/P NEW LOW 30 MIN: CPT | Performed by: SURGERY

## 2023-11-13 PROCEDURE — 1036F TOBACCO NON-USER: CPT | Performed by: SURGERY

## 2023-11-13 ASSESSMENT — PAIN SCALES - GENERAL: PAINLEVEL: 4

## 2023-11-13 NOTE — LETTER
November 13, 2023     Zuly Elizondo MD  [address]  [facility] [[email or web address] [address]  [facility] [address]    Patient: Wellington Meredith   YOB: 1965   Date of Visit: 11/13/2023       Dear Dr. Zuly Elizondo MD:    Thank you for referring Wellington Meredith to me for evaluation. Below are my notes for this consultation.  If you have questions, please do not hesitate to call me. I look forward to following your patient along with you.       Sincerely,     Zafar Narvaez MD      CC: No Recipients  ______________________________________________________________________________________    Subjective  Patient ID: Wellington Meredith is a 57 y.o. male who presents for Hernia (hernia).  HPI  Patient is a very pleasant 57-year-old man who is referred for evaluation and treatment of a symptomatic right inguinal hernia.  For almost a year he has had some intermittent pain in the right groin.  His symptoms are exacerbated usually in the middle of the night when he rolls over while sleeping.  Also with coughing.  Minimal symptoms of discomfort with exertion.  He has not noted a bulge per se    Review of Systems     On review of systems patient denies any weight loss, fever, change in bowel habits, melena, hematochezia, coronary artery disease, diabetes,  , TIA/CVA, bleeding, jaundice, pancreatitis, hepatitis.    Patient does not smoke.    He is a tin    Past Medical History:   Diagnosis Date   • Hypertension    Thoracic aortic aneurism     PSH:    axillary lymph node biopsy  Ankle surgery      Objective  Physical Exam    Well-nourished, well-developed. In no distress  Alert and oriented x 3  Lungs are clear to auscultation bilaterally.  Cardiac exam is regular rhythm and rate.  Abdomen is soft nontender nondistended.  Neurologic exam is without focal deficit.   Small right inguinal hernia best appreciated with cough and Valsalva.  No left inguinal hernia      Assessment/Plan  Diagnoses and all orders for this  visit:  Unilateral inguinal hernia without obstruction or gangrene, recurrence not specified  -     Case Request Operating Room: Repair Inguinal Hernia Laparoscopy; Standing  Other orders  -     Place in outpatient/hospital ambulatory surgery; Standing  -     Full code; Standing    Impression: Right inguinal hernia.  I have recommended laparoscopic versus robotic right inguinal hernia repair with mesh.  We discussed the procedure to include risk, benefits and alternatives.  My office will schedule the surgery for later this month

## 2023-11-13 NOTE — PROGRESS NOTES
Subjective   Patient ID: Wellington Meredith is a 57 y.o. male who presents for Hernia (hernia).  HPI  Patient is a very pleasant 57-year-old man who is referred for evaluation and treatment of a symptomatic right inguinal hernia.  For almost a year he has had some intermittent pain in the right groin.  His symptoms are exacerbated usually in the middle of the night when he rolls over while sleeping.  Also with coughing.  Minimal symptoms of discomfort with exertion.  He has not noted a bulge per se    Review of Systems     On review of systems patient denies any weight loss, fever, change in bowel habits, melena, hematochezia, coronary artery disease, diabetes,  , TIA/CVA, bleeding, jaundice, pancreatitis, hepatitis.    Patient does not smoke.    He is a tin    Past Medical History:   Diagnosis Date    Hypertension    Thoracic aortic aneurism     PSH:    axillary lymph node biopsy  Ankle surgery      Objective   Physical Exam    Well-nourished, well-developed. In no distress  Alert and oriented x 3  Lungs are clear to auscultation bilaterally.  Cardiac exam is regular rhythm and rate.  Abdomen is soft nontender nondistended.  Neurologic exam is without focal deficit.   Small right inguinal hernia best appreciated with cough and Valsalva.  No left inguinal hernia      Assessment/Plan   Diagnoses and all orders for this visit:  Unilateral inguinal hernia without obstruction or gangrene, recurrence not specified  -     Case Request Operating Room: Repair Inguinal Hernia Laparoscopy; Standing  Other orders  -     Place in outpatient/hospital ambulatory surgery; Standing  -     Full code; Standing    Impression: Right inguinal hernia.  I have recommended laparoscopic versus robotic right inguinal hernia repair with mesh.  We discussed the procedure to include risk, benefits and alternatives.  My office will schedule the surgery for later this month

## 2023-11-14 LAB
AORTIC VALVE PEAK VELOCITY: 1.2
AV PEAK GRADIENT: 5.8
AVA (PEAK VEL): 3.6
EJECTION FRACTION APICAL 4 CHAMBER: 64.5
EJECTION FRACTION: 65
LEFT ATRIUM VOLUME AREA LENGTH INDEX BSA: 20.8
LEFT VENTRICLE INTERNAL DIMENSION DIASTOLE: 4.62 (ref 3.5–6)
LEFT VENTRICULAR OUTFLOW TRACT DIAMETER: 2.38
MITRAL VALVE E/A RATIO: 1.34
MITRAL VALVE E/E' RATIO: 8.27
RIGHT VENTRICLE FREE WALL PEAK S': 11
RIGHT VENTRICLE PEAK SYSTOLIC PRESSURE: 20
TRICUSPID ANNULAR PLANE SYSTOLIC EXCURSION: 1.6

## 2023-11-15 DIAGNOSIS — L30.9 DERMATITIS: Primary | ICD-10-CM

## 2023-11-15 PROBLEM — K40.90 UNILATERAL INGUINAL HERNIA WITHOUT OBSTRUCTION OR GANGRENE: Status: ACTIVE | Noted: 2023-11-13

## 2023-11-15 RX ORDER — TRIAMCINOLONE ACETONIDE 1 MG/G
CREAM TOPICAL 2 TIMES DAILY
Qty: 30 G | Refills: 0 | Status: SHIPPED | OUTPATIENT
Start: 2023-11-15 | End: 2023-11-17 | Stop reason: SDUPTHER

## 2023-11-17 ENCOUNTER — TELEPHONE (OUTPATIENT)
Dept: PRIMARY CARE | Facility: CLINIC | Age: 58
End: 2023-11-17
Payer: COMMERCIAL

## 2023-11-17 DIAGNOSIS — L30.9 DERMATITIS: ICD-10-CM

## 2023-11-17 NOTE — TELEPHONE ENCOUNTER
Pt's wife left a msg stating that the script for the Triamcinolone cream needs to be sent to [facility] in [place].  They have never used [facility] before.

## 2023-11-18 RX ORDER — TRIAMCINOLONE ACETONIDE 1 MG/G
CREAM TOPICAL 2 TIMES DAILY
Qty: 30 G | Refills: 0 | Status: SHIPPED | OUTPATIENT
Start: 2023-11-18

## 2023-12-20 ENCOUNTER — TELEPHONE (OUTPATIENT)
Dept: PREADMISSION TESTING | Facility: HOSPITAL | Age: 58
End: 2023-12-20
Payer: COMMERCIAL

## 2023-12-22 ENCOUNTER — TELEMEDICINE CLINICAL SUPPORT (OUTPATIENT)
Dept: PREADMISSION TESTING | Facility: HOSPITAL | Age: 58
End: 2023-12-22
Payer: COMMERCIAL

## 2023-12-22 ENCOUNTER — TELEPHONE (OUTPATIENT)
Dept: PREADMISSION TESTING | Facility: HOSPITAL | Age: 58
End: 2023-12-22
Payer: COMMERCIAL

## 2023-12-22 RX ORDER — VIT C/E/ZN/COPPR/LUTEIN/ZEAXAN 250MG-90MG
25 CAPSULE ORAL DAILY
COMMUNITY

## 2023-12-22 RX ORDER — OMEPRAZOLE 20 MG/1
20 TABLET, DELAYED RELEASE ORAL DAILY PRN
COMMUNITY

## 2023-12-27 ENCOUNTER — LAB (OUTPATIENT)
Dept: LAB | Facility: LAB | Age: 58
End: 2023-12-27
Payer: COMMERCIAL

## 2023-12-27 ENCOUNTER — TELEPHONE (OUTPATIENT)
Dept: PRIMARY CARE | Facility: CLINIC | Age: 58
End: 2023-12-27

## 2023-12-27 ENCOUNTER — PRE-ADMISSION TESTING (OUTPATIENT)
Dept: PREADMISSION TESTING | Facility: HOSPITAL | Age: 58
End: 2023-12-27
Payer: COMMERCIAL

## 2023-12-27 VITALS
HEIGHT: 75 IN | RESPIRATION RATE: 18 BRPM | HEART RATE: 82 BPM | BODY MASS INDEX: 32.89 KG/M2 | TEMPERATURE: 97.9 F | SYSTOLIC BLOOD PRESSURE: 155 MMHG | WEIGHT: 264.55 LBS | DIASTOLIC BLOOD PRESSURE: 96 MMHG

## 2023-12-27 DIAGNOSIS — Z01.818 PREOP EXAMINATION: ICD-10-CM

## 2023-12-27 DIAGNOSIS — Z01.818 PREOP EXAMINATION: Primary | ICD-10-CM

## 2023-12-27 LAB
ANION GAP SERPL CALC-SCNC: 12 MMOL/L (ref 10–20)
BUN SERPL-MCNC: 15 MG/DL (ref 6–23)
CALCIUM SERPL-MCNC: 9.8 MG/DL (ref 8.6–10.3)
CHLORIDE SERPL-SCNC: 103 MMOL/L (ref 98–107)
CO2 SERPL-SCNC: 28 MMOL/L (ref 21–32)
CREAT SERPL-MCNC: 1.05 MG/DL (ref 0.5–1.3)
ERYTHROCYTE [DISTWIDTH] IN BLOOD BY AUTOMATED COUNT: 12.5 % (ref 11.5–14.5)
GFR SERPL CREATININE-BSD FRML MDRD: 81 ML/MIN/1.73M*2
GLUCOSE SERPL-MCNC: 86 MG/DL (ref 74–99)
HCT VFR BLD AUTO: 46.9 % (ref 41–52)
HGB BLD-MCNC: 16.2 G/DL (ref 13.5–17.5)
MCH RBC QN AUTO: 30.3 PG (ref 26–34)
MCHC RBC AUTO-ENTMCNC: 34.5 G/DL (ref 32–36)
MCV RBC AUTO: 88 FL (ref 80–100)
NRBC BLD-RTO: 0 /100 WBCS (ref 0–0)
PLATELET # BLD AUTO: 183 X10*3/UL (ref 150–450)
POTASSIUM SERPL-SCNC: 4.2 MMOL/L (ref 3.5–5.3)
RBC # BLD AUTO: 5.34 X10*6/UL (ref 4.5–5.9)
SODIUM SERPL-SCNC: 139 MMOL/L (ref 136–145)
WBC # BLD AUTO: 10.2 X10*3/UL (ref 4.4–11.3)

## 2023-12-27 PROCEDURE — 80048 BASIC METABOLIC PNL TOTAL CA: CPT

## 2023-12-27 PROCEDURE — 87081 CULTURE SCREEN ONLY: CPT | Mod: AHULAB | Performed by: NURSE PRACTITIONER

## 2023-12-27 PROCEDURE — 85027 COMPLETE CBC AUTOMATED: CPT

## 2023-12-27 PROCEDURE — 99203 OFFICE O/P NEW LOW 30 MIN: CPT | Performed by: NURSE PRACTITIONER

## 2023-12-27 PROCEDURE — 93005 ELECTROCARDIOGRAM TRACING: CPT | Performed by: NURSE PRACTITIONER

## 2023-12-27 PROCEDURE — 36415 COLL VENOUS BLD VENIPUNCTURE: CPT

## 2023-12-27 RX ORDER — CHLORHEXIDINE GLUCONATE ORAL RINSE 1.2 MG/ML
15 SOLUTION DENTAL 2 TIMES DAILY
Qty: 473 ML | Refills: 0 | Status: SHIPPED | OUTPATIENT
Start: 2023-12-27 | End: 2024-01-09 | Stop reason: HOSPADM

## 2023-12-27 ASSESSMENT — ENCOUNTER SYMPTOMS
CONSTITUTIONAL NEGATIVE: 1
EYES NEGATIVE: 1
MUSCULOSKELETAL NEGATIVE: 1
COUGH: 1
NEUROLOGICAL NEGATIVE: 1
ENDOCRINE NEGATIVE: 1
GASTROINTESTINAL NEGATIVE: 1
NECK NEGATIVE: 1

## 2023-12-27 NOTE — CPM/PAT H&P
[facility]/PAT Evaluation       Name: Wellington Meredith (Wellington Meredith)  /Age: 1965/58 y.o.     In-Person       HPI        Date of Consult: 23    Referring Provider: Dr. Narvaez     Right inguinal hernia repair with mesh placement, 24    Wellington Meredith is a 58 year-old male who presents to the [facility] for perioperative risk assessment prior to surgery.    Patient presents with a primary diagnosis of right inguinal hernia. For almost a year he has had some intermittent pain in the right groin.  His symptoms are exacerbated usually in the middle of the night when he rolls over while sleeping.  Also with coughing.  Minimal symptoms of discomfort with exertion.  He has not noted a bulge per se     This note was created in part upon personal review of patient's medical records.      Patient is scheduled to have Right inguinal hernia repair with mesh placement    SUSPECTED CHRISSY PENDING SLEEP TEST    Pt denies any past history of anesthetic complications such as PONV, awareness, prolonged sedation, dental damage, aspiration, cardiac arrest, difficult intubation, difficult I.V. access or unexpected hospital admissions.  NO malignant hyperthermia and or pseudocholinesterase deficiency.  No history of blood transfusions     The patient is not a Spiritism and will accept blood and blood products if medically indicated.   Type and screen not sent.     Past Medical History:   Diagnosis Date    Atypical chest pain     ECHO ordered by PCP, done 23, LV function WNL    DVT (deep venous thrombosis) (CMS/HCC)     with migration to kidney, per patient etiology not determined    GERD (gastroesophageal reflux disease)     occassional OTC omeprazole    Hernia, inguinal     Right    HTN (hypertension)     improved, amlodipine d/c'd    Hyperlipidemia     Hypertension     Snoring     CHRISSY suspected, home testing ordered    Thoracic aortic aneurysm (CMS/HCC)     Thrombocytopenia (CMS/HCC)     DYM=216 3/24/23       Past  Surgical History:   Procedure Laterality Date    FINGER SURGERY Left     5th finger tendon    TONSILLECTOMY  1968       Social History     Socioeconomic History    Marital status:      Spouse name: Not on file    Number of children: Not on file    Years of education: Not on file    Highest education level: Not on file   Occupational History    Not on file   Tobacco Use    Smoking status: Former     Packs/day: 1.50     Years: 16.00     Additional pack years: 0.00     Total pack years: 24.00     Types: Cigarettes     Quit date:      Years since quittin.0    Smokeless tobacco: Never    Tobacco comments:     Very remotely   Vaping Use    Vaping Use: Never used   Substance and Sexual Activity    Alcohol use: Yes     Alcohol/week: 6.0 standard drinks of alcohol     Types: 6 Cans of beer per week     Comment: beer or liquor    Drug use: Not Currently    Sexual activity: Not on file   Other Topics Concern    Not on file   Social History Narrative    Not on file     Social Determinants of Health     Financial Resource Strain: Not on file   Food Insecurity: Not on file   Transportation Needs: Not on file   Physical Activity: Not on file   Stress: Not on file   Social Connections: Not on file   Intimate Partner Violence: Not on file   Housing Stability: Not on file      Family History   Problem Relation Name Age of Onset    Other (Cardiac disorder) Mother          50s +tob    Skin cancer Father      Diabetes Brother          type 1    Hypertension Brother      No Known Problems Brother      Other (Cardiac disorder) Paternal Grandfather          50s +tob    Factor V Leiden deficiency Father's Brother          Factor V Leiden mutation           No Known Allergies       Current Outpatient Medications:     atorvastatin (Lipitor) 10 mg tablet, Take 1 tablet (10 mg) by mouth once daily., Disp: 30 tablet, Rfl: 5    cholecalciferol (Vitamin D3) 25 MCG (1000 UT) capsule, Take 1 capsule (25 mcg) by mouth once daily.,  "Disp: , Rfl:     cyanocobalamin (Vitamin B-12) 50 mcg tablet, , Disp: , Rfl:     mv,[place],min/iron/folic acid/lut (COMPLETE MULTI ORAL), Multi Complete Oral Capsule, Disp: , Rfl:     triamcinolone (Kenalog) 0.1 % cream, Apply topically 2 times a day. For 10 days (Patient taking differently: Apply topically 2 times a day as needed. For 10 days), Disp: 30 g, Rfl: 0    zinc gluconate 100 mg tablet, , Disp: , Rfl:     amLODIPine (Norvasc) 10 mg tablet, Take 1 tablet (10 mg) by mouth once daily. (Patient not taking: Reported on 9/26/2023), Disp: 30 tablet, Rfl: 5    chlorhexidine (Peridex) 0.12 % solution, Use 15 mL in the mouth or throat 2 times a day. Use night before surgery and morning of surgery Swish and spit, Disp: 473 mL, Rfl: 0    hydrocortisone (Anusol-HC) 25 mg suppository, Insert 1 suppository (25 mg) into the rectum 2 times a day as needed for hemorrhoids. (Patient not taking: Reported on 9/26/2023), Disp: 30 suppository, Rfl: 1    omeprazole OTC (PriLOSEC OTC) 20 mg EC tablet, Take 1 tablet (20 mg) by mouth once daily as needed. Do not crush, chew, or split., Disp: , Rfl:      PAT ROS:   Constitutional:   neg    Neuro/Psych:   neg    Eyes:   neg    Ears:   neg    Nose:   Mouth:   neg    Throat:   neg    Neck:   neg    Cardio:    Functional 4 mets, denies sob walking from [facility] to [facility] [employer]. Recent echo 11/2023 wnl  Respiratory:    cough (green phelgm: denies fevers and chills)  Endocrine:   neg    GI:   neg    :   neg    Musculoskeletal:   neg    Hematologic:   neg    Skin:  neg        Physical Exam  Vitals reviewed. Physical exam within normal limits.          PAT AIRWAY:   Airway:     Mallampati::  II      BP (!) 155/96   Pulse 82   Temp 36.6 °C (97.9 °F)   Resp 18   Ht 1.905 m (6' 3\")   Wt 120 kg (264 lb 8.8 oz)   BMI 33.07 kg/m²      Lab Results   Component Value Date    WBC 10.2 12/27/2023    HGB 16.2 12/27/2023    HCT 46.9 12/27/2023    MCV 88 12/27/2023     12/27/2023      Lab Results "   Component Value Date    GLUCOSE 86 12/27/2023    CALCIUM 9.8 12/27/2023     12/27/2023    K 4.2 12/27/2023    CO2 28 12/27/2023     12/27/2023    BUN 15 12/27/2023    CREATININE 1.05 12/27/2023        EKG in PAT 12/27/23:  NSR  Minimal voltage criteria for LVH, may be normal variant  Septal infarct, age undetermined  Abnormal EKG  HR 77 bpm    Encounter Date: 09/26/23   ECG 12 lead (Clinic Performed)    Narrative    See scanned document      ECHO 11/2023  PHYSICIAN INTERPRETATION:  Left Ventricle: The left ventricular systolic function is normal. There are no regional wall motion abnormalities. The left ventricular cavity size is normal. Spectral Doppler shows a normal pattern of left ventricular diastolic filling.  Left Atrium: The left atrium is normal in size.  Right Ventricle: The right ventricle is normal in size. There is normal right ventricular global systolic function.  Right Atrium: The right atrium is normal in size.  Aortic Valve: The aortic valve appears structurally normal. There is no evidence of aortic valve regurgitation. The peak instantaneous gradient of the aortic valve is 5.8 mmHg.  Mitral Valve: The mitral valve is normal in structure. There is mild mitral valve regurgitation.  Tricuspid Valve: The tricuspid valve is structurally normal. There is mild tricuspid regurgitation.  Pulmonic Valve: The pulmonic valve is structurally normal. There is physiologic pulmonic valve regurgitation.  Pericardium: There is no pericardial effusion noted.  Aorta: The aortic root is abnormal. There is mild dilatation of the ascending aorta.  In comparison to the previous echocardiogram(s): There are no prior studies on this patient for comparison purposes.        CONCLUSIONS:   1. Left ventricular systolic function is normal.     CT Chest 11/8/23  FINDINGS:  Ascending aorta remains at 4.1 cm. This has not changed. Stable  mediastinal lymph nodes. The descending aorta measures 2.8 cm. No  pericardial  "effusion. Gynecomastia. No central airway lesion.          No new or growing pulmonary nodules. Stable left lower lobe nodule on  image 285 measuring 3 mm. Small hiatal hernia is seen. Fatty  infiltration liver fatty infiltration of the liver      IMPRESSION:  1.  Stable ascending aortic aneurysmal dilatation up to about 4.1 cm.  No acute findings. Stable tiny pulmonary nodule. No new or growing  pulmonary nodules    Assessment and Plan:         Patient is a 58-year-old male scheduled for a with Dr. Narvaez on 1/9/24.  Patient has no active cardiac symptoms.   Patient denies any chest pain, tightness, heaviness, pressure, radiating pain, palpitations, irregular heartbeats, lightheadedness, cough, congestion, shortness of breath, ROMAN, PND, near syncope, weight loss or gain.     RCRI  1  , 6 % Risk of MACE    Cardiology:  -- Controlled HTN: EKG, BMP and CBC ordered    Pulmonology:  -- CHRISSY-Patient asked to follow up with PCP for suspected CHRISSY. Recommend prioritizing  nonopioid analgesic techniques (regional and local anesthesia, nonsteroidal medications, etc) before the administration of opioids and close monitoring for hypoventilation after surgery due to suspected CHRISSY. If intravenous narcotics are needed beyond the immediate michelle-operative period, the patient may benefit from continuous pulse oximetry to monitor for hypoxic events till baseline Sp02 is normal on room air and  a respiratory therapy evaluation.  -- patient c/o mild productive cough producing green phlegm that started a couple of days ago, he states he has a history of bronchitis. Instruct patient call his pcp for further medical management. Also instruct patient to call Dr. Narvaez's office if his symptoms persist or become worse. Patient verbally agreed and states \"he is going to call his pcp today\"    Hematology:  Patient instructed to ambulate as soon as possible postoperatively to decrease thromboembolic risk.   Initiate mechanical DVT prophylaxis as " soon as possible and initiate chemical prophylaxis when deemed safe from a bleeding standpoint post surgery.       Caprini: 7    Risk assessment complete.  Patient is scheduled for a low surgical risk procedure.        Preoperative medication instructions were provided and reviewed with the patient.  Any additional testing or evaluation was explained to the patient.  Nothing by mouth instructions were discussed and patient's questions were answered prior to conclusion to this encounter.  Patient verbalized understanding of preoperative instructions given in preadmission testing; discharge instructions available in EMR.    This note was dictated by a speech recognition.  Minor errors may have been detected in a speech recognition.

## 2023-12-27 NOTE — PREPROCEDURE INSTRUCTIONS
Medication List            Accurate as of December 27, 2023  1:04 PM. Always use your most recent med list.                amLODIPine 10 mg tablet  Commonly known as: Norvasc  Take 1 tablet (10 mg) by mouth once daily.  Medication Adjustments for Surgery: Take morning of surgery with sip of water, no other fluids     atorvastatin 10 mg tablet  Commonly known as: Lipitor  Take 1 tablet (10 mg) by mouth once daily.  Medication Adjustments for Surgery: Take morning of surgery with sip of water, no other fluids     chlorhexidine 0.12 % solution  Commonly known as: Peridex  Use 15 mL in the mouth or throat 2 times a day. Use night before surgery and morning of surgery Swish and spit  Medication Adjustments for Surgery: Take morning of surgery with sip of water, no other fluids     COMPLETE MULTI ORAL  Medication Adjustments for Surgery: Stop 7 days before surgery     cyanocobalamin 50 mcg tablet  Commonly known as: Vitamin B-12  Medication Adjustments for Surgery: Continue until night before surgery     hydrocortisone 25 mg suppository  Commonly known as: Anusol-HC  Insert 1 suppository (25 mg) into the rectum 2 times a day as needed for hemorrhoids.  Medication Adjustments for Surgery: Continue until night before surgery     omeprazole OTC 20 mg EC tablet  Commonly known as: PriLOSEC OTC  Medication Adjustments for Surgery: Take morning of surgery with sip of water, no other fluids     triamcinolone 0.1 % cream  Commonly known as: Kenalog  Apply topically 2 times a day. For 10 days  Medication Adjustments for Surgery: Continue until night before surgery     Vitamin D3 25 MCG (1000 UT) capsule  Generic drug: cholecalciferol  Medication Adjustments for Surgery: Continue until night before surgery     zinc gluconate 100 mg tablet  Medication Adjustments for Surgery: Stop 7 days before surgery                          CONTACT SURGEON'S OFFICE IF YOU DEVELOP:  * Fever = 100.4 F   * New respiratory symptoms (e.g. cough,  shortness of breath, respiratory distress, sore throat)  * Recent loss of taste or smell  *Flu like symptoms such as headache, fatigue or gastrointestinal symptoms  * You develop any open sores, shingles, burning or painful urination   AND/OR:  * You no longer wish to have the surgery.  * Any other personal circumstances change that may lead to the need to cancel or defer this surgery.  *You were admitted to any hospital within one week of your planned procedure.    SMOKING:  *Quitting smoking can make a huge difference to your health and recovery from surgery.    *If you need help with quitting, call 5-079-QUIT-NOW.    THE DAY BEFORE SURGERY:  *Do not eat any food after midnight the night before surgery.   *You are permitted to drink clear liquids (i.e. water, black coffee, tea, clear broth, apple juice) up to 2 hours before your surgery.    DIABETICS:  If diabetic, nothing by mouth (no solids or liquids) for 8 hours prior to surgery.   Please check fasting blood sugar upon waking up.  If fasting sugar is <80 mg/dl, please drink 100ml/3oz of apple juice no later than 2 hours prior to surgery.      SURGICAL TIME  *You will be contacted between 2 p.m. and 6 p.m. the business day before your surgery with your arrival time.  *If you haven't received a call by 6pm, call [phone].  *Scheduled surgery times may change and you will be notified if this occurs-check your personal voicemail for any updates.    ON THE MORNING OF SURGERY:  *Wear comfortable, loose fitting clothing.   *Do not use moisturizers, creams, lotions or perfume.  *All jewelry and valuables should be left at home.  *Prosthetic devices such as contact lenses, hearing aids, dentures, eyelash extensions, hairpins and body piercing must be removed before surgery.    BRING WITH YOU:  *Photo ID and insurance card  *Current list of medicines and allergies  *Pacemaker/Defibrillator/Heart stent cards  *CPAP machine and mask  *Slings/splints/crutches  *Copy of  your complete Advanced Directive/DHPOA-if applicable  *Neurostimulator implant remote    PARKING AND ARRIVAL:  *Check in at the Main Entrance desk and let them know you are here for surgery.  *You will be directed to the 2nd floor surgical waiting area.    AFTER OUTPATIENT SURGERY:  *A responsible adult MUST accompany you at the time of discharge and stay with you for 24 hours after your surgery.  *You may NOT drive yourself home after surgery.  *You may use a taxi or ride sharing service ([employer], [employer]) to return home ONLY if you are accompanied by a friend or family member.  *Instructions for resuming your medications will be provided by your surgeon.

## 2023-12-27 NOTE — TELEPHONE ENCOUNTER
Pt left a msg stating that he has surgery with Dr. Narvaez on 1/9/24.  He is coughing up green sputum and has the same nasal discharge also.  The nurse at his clearance appt told him to call and see if he can get an A/B to help get this cleared up before his SX

## 2023-12-27 NOTE — H&P (VIEW-ONLY)
[facility]/PAT Evaluation       Name: Wellington Meredith (Wellington Meredith)  /Age: 1965/58 y.o.     In-Person       HPI        Date of Consult: 23    Referring Provider: Dr. Narvaez     Right inguinal hernia repair with mesh placement, 24    Wellington Meredith is a 58 year-old male who presents to the [facility] for perioperative risk assessment prior to surgery.    Patient presents with a primary diagnosis of right inguinal hernia. For almost a year he has had some intermittent pain in the right groin.  His symptoms are exacerbated usually in the middle of the night when he rolls over while sleeping.  Also with coughing.  Minimal symptoms of discomfort with exertion.  He has not noted a bulge per se     This note was created in part upon personal review of patient's medical records.      Patient is scheduled to have Right inguinal hernia repair with mesh placement    SUSPECTED CHRISSY PENDING SLEEP TEST    Pt denies any past history of anesthetic complications such as PONV, awareness, prolonged sedation, dental damage, aspiration, cardiac arrest, difficult intubation, difficult I.V. access or unexpected hospital admissions.  NO malignant hyperthermia and or pseudocholinesterase deficiency.  No history of blood transfusions     The patient is not a Religious and will accept blood and blood products if medically indicated.   Type and screen not sent.     Past Medical History:   Diagnosis Date    Atypical chest pain     ECHO ordered by PCP, done 23, LV function WNL    DVT (deep venous thrombosis) (CMS/HCC)     with migration to kidney, per patient etiology not determined    GERD (gastroesophageal reflux disease)     occassional OTC omeprazole    Hernia, inguinal     Right    HTN (hypertension)     improved, amlodipine d/c'd    Hyperlipidemia     Hypertension     Snoring     CHRISSY suspected, home testing ordered    Thoracic aortic aneurysm (CMS/HCC)     Thrombocytopenia (CMS/HCC)     ZCF=558 3/24/23       Past  Surgical History:   Procedure Laterality Date    FINGER SURGERY Left     5th finger tendon    TONSILLECTOMY  1968       Social History     Socioeconomic History    Marital status:      Spouse name: Not on file    Number of children: Not on file    Years of education: Not on file    Highest education level: Not on file   Occupational History    Not on file   Tobacco Use    Smoking status: Former     Packs/day: 1.50     Years: 16.00     Additional pack years: 0.00     Total pack years: 24.00     Types: Cigarettes     Quit date:      Years since quittin.0    Smokeless tobacco: Never    Tobacco comments:     Very remotely   Vaping Use    Vaping Use: Never used   Substance and Sexual Activity    Alcohol use: Yes     Alcohol/week: 6.0 standard drinks of alcohol     Types: 6 Cans of beer per week     Comment: beer or liquor    Drug use: Not Currently    Sexual activity: Not on file   Other Topics Concern    Not on file   Social History Narrative    Not on file     Social Determinants of Health     Financial Resource Strain: Not on file   Food Insecurity: Not on file   Transportation Needs: Not on file   Physical Activity: Not on file   Stress: Not on file   Social Connections: Not on file   Intimate Partner Violence: Not on file   Housing Stability: Not on file      Family History   Problem Relation Name Age of Onset    Other (Cardiac disorder) Mother          50s +tob    Skin cancer Father      Diabetes Brother          type 1    Hypertension Brother      No Known Problems Brother      Other (Cardiac disorder) Paternal Grandfather          50s +tob    Factor V Leiden deficiency Father's Brother          Factor V Leiden mutation           No Known Allergies       Current Outpatient Medications:     atorvastatin (Lipitor) 10 mg tablet, Take 1 tablet (10 mg) by mouth once daily., Disp: 30 tablet, Rfl: 5    cholecalciferol (Vitamin D3) 25 MCG (1000 UT) capsule, Take 1 capsule (25 mcg) by mouth once daily.,  "Disp: , Rfl:     cyanocobalamin (Vitamin B-12) 50 mcg tablet, , Disp: , Rfl:     mv,[place],min/iron/folic acid/lut (COMPLETE MULTI ORAL), Multi Complete Oral Capsule, Disp: , Rfl:     triamcinolone (Kenalog) 0.1 % cream, Apply topically 2 times a day. For 10 days (Patient taking differently: Apply topically 2 times a day as needed. For 10 days), Disp: 30 g, Rfl: 0    zinc gluconate 100 mg tablet, , Disp: , Rfl:     amLODIPine (Norvasc) 10 mg tablet, Take 1 tablet (10 mg) by mouth once daily. (Patient not taking: Reported on 9/26/2023), Disp: 30 tablet, Rfl: 5    chlorhexidine (Peridex) 0.12 % solution, Use 15 mL in the mouth or throat 2 times a day. Use night before surgery and morning of surgery Swish and spit, Disp: 473 mL, Rfl: 0    hydrocortisone (Anusol-HC) 25 mg suppository, Insert 1 suppository (25 mg) into the rectum 2 times a day as needed for hemorrhoids. (Patient not taking: Reported on 9/26/2023), Disp: 30 suppository, Rfl: 1    omeprazole OTC (PriLOSEC OTC) 20 mg EC tablet, Take 1 tablet (20 mg) by mouth once daily as needed. Do not crush, chew, or split., Disp: , Rfl:      PAT ROS:   Constitutional:   neg    Neuro/Psych:   neg    Eyes:   neg    Ears:   neg    Nose:   Mouth:   neg    Throat:   neg    Neck:   neg    Cardio:    Functional 4 mets, denies sob walking from [facility] to [place]. Recent echo 11/2023 wnl  Respiratory:    cough (green phelgm: denies fevers and chills)  Endocrine:   neg    GI:   neg    :   neg    Musculoskeletal:   neg    Hematologic:   neg    Skin:  neg        Physical Exam  Vitals reviewed. Physical exam within normal limits.          PAT AIRWAY:   Airway:     Mallampati::  II      BP (!) 155/96   Pulse 82   Temp 36.6 °C (97.9 °F)   Resp 18   Ht 1.905 m (6' 3\")   Wt 120 kg (264 lb 8.8 oz)   BMI 33.07 kg/m²      Lab Results   Component Value Date    WBC 10.2 12/27/2023    HGB 16.2 12/27/2023    HCT 46.9 12/27/2023    MCV 88 12/27/2023     12/27/2023      Lab Results "   Component Value Date    GLUCOSE 86 12/27/2023    CALCIUM 9.8 12/27/2023     12/27/2023    K 4.2 12/27/2023    CO2 28 12/27/2023     12/27/2023    BUN 15 12/27/2023    CREATININE 1.05 12/27/2023        EKG in PAT 12/27/23:  NSR  Minimal voltage criteria for LVH, may be normal variant  Septal infarct, age undetermined  Abnormal EKG  HR 77 bpm    Encounter Date: 09/26/23   ECG 12 lead (Clinic Performed)    Narrative    See scanned document      ECHO 11/2023  PHYSICIAN INTERPRETATION:  Left Ventricle: The left ventricular systolic function is normal. There are no regional wall motion abnormalities. The left ventricular cavity size is normal. Spectral Doppler shows a normal pattern of left ventricular diastolic filling.  Left Atrium: The left atrium is normal in size.  Right Ventricle: The right ventricle is normal in size. There is normal right ventricular global systolic function.  Right Atrium: The right atrium is normal in size.  Aortic Valve: The aortic valve appears structurally normal. There is no evidence of aortic valve regurgitation. The peak instantaneous gradient of the aortic valve is 5.8 mmHg.  Mitral Valve: The mitral valve is normal in structure. There is mild mitral valve regurgitation.  Tricuspid Valve: The tricuspid valve is structurally normal. There is mild tricuspid regurgitation.  Pulmonic Valve: The pulmonic valve is structurally normal. There is physiologic pulmonic valve regurgitation.  Pericardium: There is no pericardial effusion noted.  Aorta: The aortic root is abnormal. There is mild dilatation of the ascending aorta.  In comparison to the previous echocardiogram(s): There are no prior studies on this patient for comparison purposes.        CONCLUSIONS:   1. Left ventricular systolic function is normal.     CT Chest 11/8/23  FINDINGS:  Ascending aorta remains at 4.1 cm. This has not changed. Stable  mediastinal lymph nodes. The descending aorta measures 2.8 cm. No  pericardial  "effusion. Gynecomastia. No central airway lesion.          No new or growing pulmonary nodules. Stable left lower lobe nodule on  image 285 measuring 3 mm. Small hiatal hernia is seen. Fatty  infiltration liver fatty infiltration of the liver      IMPRESSION:  1.  Stable ascending aortic aneurysmal dilatation up to about 4.1 cm.  No acute findings. Stable tiny pulmonary nodule. No new or growing  pulmonary nodules    Assessment and Plan:         Patient is a 58-year-old male scheduled for a with Dr. Narvaez on 1/9/24.  Patient has no active cardiac symptoms.   Patient denies any chest pain, tightness, heaviness, pressure, radiating pain, palpitations, irregular heartbeats, lightheadedness, cough, congestion, shortness of breath, ROMAN, PND, near syncope, weight loss or gain.     RCRI  1  , 6 % Risk of MACE    Cardiology:  -- Controlled HTN: EKG, BMP and CBC ordered    Pulmonology:  -- CHRISSY-Patient asked to follow up with PCP for suspected CHRISSY. Recommend prioritizing  nonopioid analgesic techniques (regional and local anesthesia, nonsteroidal medications, etc) before the administration of opioids and close monitoring for hypoventilation after surgery due to suspected CHRISSY. If intravenous narcotics are needed beyond the immediate michelle-operative period, the patient may benefit from continuous pulse oximetry to monitor for hypoxic events till baseline Sp02 is normal on room air and  a respiratory therapy evaluation.  -- patient c/o mild productive cough producing green phlegm that started a couple of days ago, he states he has a history of bronchitis. Instruct patient call his pcp for further medical management. Also instruct patient to call Dr. Narvaez's office if his symptoms persist or become worse. Patient verbally agreed and states \"he is going to call his pcp today\"    Hematology:  Patient instructed to ambulate as soon as possible postoperatively to decrease thromboembolic risk.   Initiate mechanical DVT prophylaxis as " soon as possible and initiate chemical prophylaxis when deemed safe from a bleeding standpoint post surgery.       Caprini: 7    Risk assessment complete.  Patient is scheduled for a low surgical risk procedure.        Preoperative medication instructions were provided and reviewed with the patient.  Any additional testing or evaluation was explained to the patient.  Nothing by mouth instructions were discussed and patient's questions were answered prior to conclusion to this encounter.  Patient verbalized understanding of preoperative instructions given in preadmission testing; discharge instructions available in EMR.    This note was dictated by a speech recognition.  Minor errors may have been detected in a speech recognition.

## 2023-12-28 DIAGNOSIS — Z01.818 PREOP TESTING: Primary | ICD-10-CM

## 2023-12-28 RX ORDER — CHLORHEXIDINE GLUCONATE ORAL RINSE 1.2 MG/ML
SOLUTION DENTAL
Qty: 475 ML | Refills: 0 | Status: SHIPPED | OUTPATIENT
Start: 2023-12-28 | End: 2024-01-09 | Stop reason: HOSPADM

## 2023-12-29 DIAGNOSIS — J42 CHRONIC BRONCHITIS, UNSPECIFIED CHRONIC BRONCHITIS TYPE (MULTI): Primary | ICD-10-CM

## 2023-12-29 LAB — STAPHYLOCOCCUS SPEC CULT: NORMAL

## 2023-12-29 RX ORDER — DOXYCYCLINE 100 MG/1
100 CAPSULE ORAL 2 TIMES DAILY
Qty: 14 CAPSULE | Refills: 0 | Status: SHIPPED | OUTPATIENT
Start: 2023-12-29 | End: 2024-01-09 | Stop reason: HOSPADM

## 2023-12-30 LAB
ATRIAL RATE: 77 BPM
P AXIS: 55 DEGREES
P OFFSET: 203 MS
P ONSET: 137 MS
PR INTERVAL: 176 MS
Q ONSET: 225 MS
QRS COUNT: 12 BEATS
QRS DURATION: 102 MS
QT INTERVAL: 392 MS
QTC CALCULATION(BAZETT): 443 MS
QTC FREDERICIA: 425 MS
R AXIS: -5 DEGREES
T AXIS: 25 DEGREES
T OFFSET: 421 MS
VENTRICULAR RATE: 77 BPM

## 2024-01-04 RX ORDER — DOXYCYCLINE 100 MG/1
100 CAPSULE ORAL 2 TIMES DAILY
Qty: 10 CAPSULE | Refills: 0 | Status: CANCELLED | OUTPATIENT
Start: 2024-01-04 | End: 2024-01-09

## 2024-01-09 ENCOUNTER — ANESTHESIA (OUTPATIENT)
Dept: OPERATING ROOM | Facility: HOSPITAL | Age: 59
End: 2024-01-09
Payer: COMMERCIAL

## 2024-01-09 ENCOUNTER — HOSPITAL ENCOUNTER (OUTPATIENT)
Facility: HOSPITAL | Age: 59
Setting detail: OUTPATIENT SURGERY
Discharge: HOME | End: 2024-01-09
Attending: SURGERY | Admitting: SURGERY
Payer: COMMERCIAL

## 2024-01-09 ENCOUNTER — ANESTHESIA EVENT (OUTPATIENT)
Dept: OPERATING ROOM | Facility: HOSPITAL | Age: 59
End: 2024-01-09
Payer: COMMERCIAL

## 2024-01-09 VITALS
HEART RATE: 81 BPM | RESPIRATION RATE: 19 BRPM | TEMPERATURE: 97.2 F | SYSTOLIC BLOOD PRESSURE: 145 MMHG | OXYGEN SATURATION: 96 % | BODY MASS INDEX: 33.2 KG/M2 | WEIGHT: 266.98 LBS | DIASTOLIC BLOOD PRESSURE: 88 MMHG | HEIGHT: 75 IN

## 2024-01-09 DIAGNOSIS — K40.90 UNILATERAL INGUINAL HERNIA WITHOUT OBSTRUCTION OR GANGRENE, RECURRENCE NOT SPECIFIED: ICD-10-CM

## 2024-01-09 DIAGNOSIS — K40.90 NON-RECURRENT UNILATERAL INGUINAL HERNIA WITHOUT OBSTRUCTION OR GANGRENE: Primary | ICD-10-CM

## 2024-01-09 PROCEDURE — 88304 TISSUE EXAM BY PATHOLOGIST: CPT | Performed by: PATHOLOGY

## 2024-01-09 PROCEDURE — 49650 LAP ING HERNIA REPAIR INIT: CPT | Performed by: SURGERY

## 2024-01-09 PROCEDURE — A49650 PR LAP,INGUINAL HERNIA REPR,INITIAL: Performed by: STUDENT IN AN ORGANIZED HEALTH CARE EDUCATION/TRAINING PROGRAM

## 2024-01-09 PROCEDURE — 2500000004 HC RX 250 GENERAL PHARMACY W/ HCPCS (ALT 636 FOR OP/ED): Performed by: ANESTHESIOLOGIST ASSISTANT

## 2024-01-09 PROCEDURE — 7100000010 HC PHASE TWO TIME - EACH INCREMENTAL 1 MINUTE: Performed by: SURGERY

## 2024-01-09 PROCEDURE — 3600000003 HC OR TIME - INITIAL BASE CHARGE - PROCEDURE LEVEL THREE: Performed by: SURGERY

## 2024-01-09 PROCEDURE — 2720000007 HC OR 272 NO HCPCS: Performed by: SURGERY

## 2024-01-09 PROCEDURE — 2500000004 HC RX 250 GENERAL PHARMACY W/ HCPCS (ALT 636 FOR OP/ED): Performed by: SURGERY

## 2024-01-09 PROCEDURE — 7100000001 HC RECOVERY ROOM TIME - INITIAL BASE CHARGE: Performed by: SURGERY

## 2024-01-09 PROCEDURE — A4217 STERILE WATER/SALINE, 500 ML: HCPCS | Performed by: SURGERY

## 2024-01-09 PROCEDURE — 7100000002 HC RECOVERY ROOM TIME - EACH INCREMENTAL 1 MINUTE: Performed by: SURGERY

## 2024-01-09 PROCEDURE — 7100000009 HC PHASE TWO TIME - INITIAL BASE CHARGE: Performed by: SURGERY

## 2024-01-09 PROCEDURE — C1781 MESH (IMPLANTABLE): HCPCS | Performed by: SURGERY

## 2024-01-09 PROCEDURE — 3600000008 HC OR TIME - EACH INCREMENTAL 1 MINUTE - PROCEDURE LEVEL THREE: Performed by: SURGERY

## 2024-01-09 PROCEDURE — 2500000005 HC RX 250 GENERAL PHARMACY W/O HCPCS: Performed by: ANESTHESIOLOGIST ASSISTANT

## 2024-01-09 PROCEDURE — A49650 PR LAP,INGUINAL HERNIA REPR,INITIAL: Performed by: NURSE ANESTHETIST, CERTIFIED REGISTERED

## 2024-01-09 PROCEDURE — 2500000004 HC RX 250 GENERAL PHARMACY W/ HCPCS (ALT 636 FOR OP/ED): Performed by: NURSE ANESTHETIST, CERTIFIED REGISTERED

## 2024-01-09 PROCEDURE — 2500000005 HC RX 250 GENERAL PHARMACY W/O HCPCS: Performed by: NURSE ANESTHETIST, CERTIFIED REGISTERED

## 2024-01-09 PROCEDURE — 3700000002 HC GENERAL ANESTHESIA TIME - EACH INCREMENTAL 1 MINUTE: Performed by: SURGERY

## 2024-01-09 PROCEDURE — 2500000005 HC RX 250 GENERAL PHARMACY W/O HCPCS: Performed by: SURGERY

## 2024-01-09 PROCEDURE — 11402 EXC TR-EXT B9+MARG 1.1-2 CM: CPT | Performed by: SURGERY

## 2024-01-09 PROCEDURE — 2780000003 HC OR 278 NO HCPCS: Performed by: SURGERY

## 2024-01-09 PROCEDURE — 0752T DGTZ GLS MCRSCP SLD LVL III: CPT | Mod: TC,SUR,AHULAB | Performed by: SURGERY

## 2024-01-09 PROCEDURE — 3700000001 HC GENERAL ANESTHESIA TIME - INITIAL BASE CHARGE: Performed by: SURGERY

## 2024-01-09 PROCEDURE — 2500000001 HC RX 250 WO HCPCS SELF ADMINISTERED DRUGS (ALT 637 FOR MEDICARE OP): Performed by: STUDENT IN AN ORGANIZED HEALTH CARE EDUCATION/TRAINING PROGRAM

## 2024-01-09 DEVICE — MESH, 3DMAX MID, 4 X 6 IN, LARGE RIGHT: Type: IMPLANTABLE DEVICE | Site: INGUINAL | Status: FUNCTIONAL

## 2024-01-09 RX ORDER — FENTANYL CITRATE 50 UG/ML
INJECTION, SOLUTION INTRAMUSCULAR; INTRAVENOUS AS NEEDED
Status: DISCONTINUED | OUTPATIENT
Start: 2024-01-09 | End: 2024-01-09

## 2024-01-09 RX ORDER — CEFAZOLIN 1 G/1
INJECTION, POWDER, FOR SOLUTION INTRAVENOUS AS NEEDED
Status: DISCONTINUED | OUTPATIENT
Start: 2024-01-09 | End: 2024-01-09

## 2024-01-09 RX ORDER — SODIUM CHLORIDE 0.9 G/100ML
IRRIGANT IRRIGATION AS NEEDED
Status: DISCONTINUED | OUTPATIENT
Start: 2024-01-09 | End: 2024-01-09 | Stop reason: HOSPADM

## 2024-01-09 RX ORDER — OXYCODONE HYDROCHLORIDE 5 MG/1
5 TABLET ORAL EVERY 6 HOURS PRN
Qty: 15 TABLET | Refills: 0 | Status: SHIPPED | OUTPATIENT
Start: 2024-01-09 | End: 2024-01-14

## 2024-01-09 RX ORDER — LIDOCAINE HYDROCHLORIDE 10 MG/ML
INJECTION INFILTRATION; PERINEURAL AS NEEDED
Status: DISCONTINUED | OUTPATIENT
Start: 2024-01-09 | End: 2024-01-09 | Stop reason: HOSPADM

## 2024-01-09 RX ORDER — BUPIVACAINE HYDROCHLORIDE 5 MG/ML
INJECTION, SOLUTION EPIDURAL; INTRACAUDAL AS NEEDED
Status: DISCONTINUED | OUTPATIENT
Start: 2024-01-09 | End: 2024-01-09 | Stop reason: HOSPADM

## 2024-01-09 RX ORDER — MIDAZOLAM HYDROCHLORIDE 1 MG/ML
INJECTION, SOLUTION INTRAMUSCULAR; INTRAVENOUS AS NEEDED
Status: DISCONTINUED | OUTPATIENT
Start: 2024-01-09 | End: 2024-01-09

## 2024-01-09 RX ORDER — SODIUM CHLORIDE, SODIUM LACTATE, POTASSIUM CHLORIDE, CALCIUM CHLORIDE 600; 310; 30; 20 MG/100ML; MG/100ML; MG/100ML; MG/100ML
INJECTION, SOLUTION INTRAVENOUS CONTINUOUS PRN
Status: DISCONTINUED | OUTPATIENT
Start: 2024-01-09 | End: 2024-01-09

## 2024-01-09 RX ORDER — PROPOFOL 10 MG/ML
INJECTION, EMULSION INTRAVENOUS AS NEEDED
Status: DISCONTINUED | OUTPATIENT
Start: 2024-01-09 | End: 2024-01-09

## 2024-01-09 RX ORDER — LIDOCAINE HYDROCHLORIDE 10 MG/ML
0.1 INJECTION, SOLUTION EPIDURAL; INFILTRATION; INTRACAUDAL; PERINEURAL ONCE
Status: DISCONTINUED | OUTPATIENT
Start: 2024-01-09 | End: 2024-01-09 | Stop reason: HOSPADM

## 2024-01-09 RX ORDER — KETOROLAC TROMETHAMINE 30 MG/ML
INJECTION, SOLUTION INTRAMUSCULAR; INTRAVENOUS AS NEEDED
Status: DISCONTINUED | OUTPATIENT
Start: 2024-01-09 | End: 2024-01-09

## 2024-01-09 RX ORDER — OXYCODONE HYDROCHLORIDE 5 MG/1
5 TABLET ORAL EVERY 4 HOURS PRN
Status: DISCONTINUED | OUTPATIENT
Start: 2024-01-09 | End: 2024-01-09 | Stop reason: HOSPADM

## 2024-01-09 RX ORDER — LIDOCAINE HYDROCHLORIDE 20 MG/ML
INJECTION, SOLUTION INFILTRATION; PERINEURAL AS NEEDED
Status: DISCONTINUED | OUTPATIENT
Start: 2024-01-09 | End: 2024-01-09

## 2024-01-09 RX ORDER — DIPHENHYDRAMINE HYDROCHLORIDE 50 MG/ML
12.5 INJECTION INTRAMUSCULAR; INTRAVENOUS ONCE AS NEEDED
Status: DISCONTINUED | OUTPATIENT
Start: 2024-01-09 | End: 2024-01-09 | Stop reason: HOSPADM

## 2024-01-09 RX ORDER — SODIUM CHLORIDE, SODIUM LACTATE, POTASSIUM CHLORIDE, CALCIUM CHLORIDE 600; 310; 30; 20 MG/100ML; MG/100ML; MG/100ML; MG/100ML
100 INJECTION, SOLUTION INTRAVENOUS CONTINUOUS
Status: DISCONTINUED | OUTPATIENT
Start: 2024-01-09 | End: 2024-01-09 | Stop reason: HOSPADM

## 2024-01-09 RX ORDER — ONDANSETRON HYDROCHLORIDE 2 MG/ML
4 INJECTION, SOLUTION INTRAVENOUS ONCE AS NEEDED
Status: DISCONTINUED | OUTPATIENT
Start: 2024-01-09 | End: 2024-01-09 | Stop reason: HOSPADM

## 2024-01-09 RX ORDER — LABETALOL HYDROCHLORIDE 5 MG/ML
5 INJECTION, SOLUTION INTRAVENOUS ONCE AS NEEDED
Status: DISCONTINUED | OUTPATIENT
Start: 2024-01-09 | End: 2024-01-09 | Stop reason: HOSPADM

## 2024-01-09 RX ORDER — ROCURONIUM BROMIDE 10 MG/ML
INJECTION, SOLUTION INTRAVENOUS AS NEEDED
Status: DISCONTINUED | OUTPATIENT
Start: 2024-01-09 | End: 2024-01-09

## 2024-01-09 RX ORDER — ONDANSETRON HYDROCHLORIDE 2 MG/ML
INJECTION, SOLUTION INTRAVENOUS AS NEEDED
Status: DISCONTINUED | OUTPATIENT
Start: 2024-01-09 | End: 2024-01-09

## 2024-01-09 RX ADMIN — FENTANYL CITRATE 50 MCG: 50 INJECTION, SOLUTION INTRAMUSCULAR; INTRAVENOUS at 13:48

## 2024-01-09 RX ADMIN — DEXAMETHASONE SODIUM PHOSPHATE 4 MG: 4 INJECTION, SOLUTION INTRAMUSCULAR; INTRAVENOUS at 13:58

## 2024-01-09 RX ADMIN — KETOROLAC TROMETHAMINE 30 MG: 30 INJECTION, SOLUTION INTRAMUSCULAR at 15:09

## 2024-01-09 RX ADMIN — ONDANSETRON 4 MG: 2 INJECTION INTRAMUSCULAR; INTRAVENOUS at 13:58

## 2024-01-09 RX ADMIN — CEFAZOLIN 3 G: 1 INJECTION, POWDER, FOR SOLUTION INTRAMUSCULAR; INTRAVENOUS at 13:55

## 2024-01-09 RX ADMIN — MIDAZOLAM HYDROCHLORIDE 2 MG: 1 INJECTION, SOLUTION INTRAMUSCULAR; INTRAVENOUS at 13:38

## 2024-01-09 RX ADMIN — SODIUM CHLORIDE, POTASSIUM CHLORIDE, SODIUM LACTATE AND CALCIUM CHLORIDE: 600; 310; 30; 20 INJECTION, SOLUTION INTRAVENOUS at 13:38

## 2024-01-09 RX ADMIN — SUGAMMADEX 200 MG: 100 INJECTION, SOLUTION INTRAVENOUS at 15:14

## 2024-01-09 RX ADMIN — ROCURONIUM BROMIDE 20 MG: 10 INJECTION, SOLUTION INTRAVENOUS at 14:39

## 2024-01-09 RX ADMIN — PROPOFOL 100 MG: 10 INJECTION, EMULSION INTRAVENOUS at 15:17

## 2024-01-09 RX ADMIN — PROPOFOL 200 MG: 10 INJECTION, EMULSION INTRAVENOUS at 13:44

## 2024-01-09 RX ADMIN — OXYCODONE HYDROCHLORIDE 5 MG: 5 TABLET ORAL at 16:22

## 2024-01-09 RX ADMIN — FENTANYL CITRATE 50 MCG: 50 INJECTION, SOLUTION INTRAMUSCULAR; INTRAVENOUS at 13:44

## 2024-01-09 RX ADMIN — PROPOFOL 50 MG: 10 INJECTION, EMULSION INTRAVENOUS at 15:24

## 2024-01-09 RX ADMIN — ROCURONIUM BROMIDE 50 MG: 10 INJECTION, SOLUTION INTRAVENOUS at 13:44

## 2024-01-09 RX ADMIN — LIDOCAINE HYDROCHLORIDE 100 MG: 20 INJECTION, SOLUTION INFILTRATION; PERINEURAL at 13:44

## 2024-01-09 RX ADMIN — PROPOFOL 50 MG: 10 INJECTION, EMULSION INTRAVENOUS at 15:19

## 2024-01-09 ASSESSMENT — PAIN - FUNCTIONAL ASSESSMENT
PAIN_FUNCTIONAL_ASSESSMENT: 0-10

## 2024-01-09 ASSESSMENT — COLUMBIA-SUICIDE SEVERITY RATING SCALE - C-SSRS
1. IN THE PAST MONTH, HAVE YOU WISHED YOU WERE DEAD OR WISHED YOU COULD GO TO SLEEP AND NOT WAKE UP?: NO
6. HAVE YOU EVER DONE ANYTHING, STARTED TO DO ANYTHING, OR PREPARED TO DO ANYTHING TO END YOUR LIFE?: NO
2. HAVE YOU ACTUALLY HAD ANY THOUGHTS OF KILLING YOURSELF?: NO

## 2024-01-09 ASSESSMENT — PAIN SCALES - GENERAL
PAINLEVEL_OUTOF10: 0 - NO PAIN
PAINLEVEL_OUTOF10: 3
PAINLEVEL_OUTOF10: 2
PAINLEVEL_OUTOF10: 3
PAINLEVEL_OUTOF10: 2
PAINLEVEL_OUTOF10: 0 - NO PAIN
PAINLEVEL_OUTOF10: 0 - NO PAIN

## 2024-01-09 ASSESSMENT — PAIN DESCRIPTION - LOCATION: LOCATION: ABDOMEN

## 2024-01-09 NOTE — ANESTHESIA PROCEDURE NOTES
Airway  Date/Time: 1/9/2024 1:50 PM  Urgency: elective    Difficult airway    Staffing  Performed: SHUBHAM   Authorized by: Isaac Cardona MD    Performed by: SHUBHAM Alonzo  Patient location during procedure: OR    Indications and Patient Condition  Indications for airway management: anesthesia  Spontaneous ventilation: present  Sedation level: moderate (conscious sedation)  Preoxygenated: yes  Patient position: sniffing  Mask difficulty assessment: 2 - vent by mask + OA or adjuvant +/- NMBA  Planned trial extubation    Final Airway Details  Final airway type: endotracheal airway      Successful airway: ETT  Cuffed: yes   Successful intubation technique: direct laryngoscopy  Facilitating devices/methods: intubating stylet  Endotracheal tube insertion site: oral  Blade: Isidoro  Blade size: #4  ETT size (mm): 8.0  Cormack-Lehane Classification: grade I - full view of glottis  Placement verified by: chest auscultation   Measured from: lips  ETT to lips (cm): 21

## 2024-01-09 NOTE — ANESTHESIA POSTPROCEDURE EVALUATION
Patient: Wellington Meredith    Procedure Summary       Date: 01/09/24 Room / Location: [facility] A OR 05 / [facility] OR    Anesthesia Start: 1328 Anesthesia Stop: 1556    Procedures:       Laparoscopic Right Inguinal Hernia Repair; Mesh Placement (Right)      Excision Lesion Back (Right) Diagnosis:       Unilateral inguinal hernia without obstruction or gangrene, recurrence not specified      (Unilateral inguinal hernia without obstruction or gangrene, recurrence not specified [K40.90])    Surgeons: Zafar Narvaez MD Responsible Provider: Isaac Cardona MD    Anesthesia Type: general ASA Status: 2            Anesthesia Type: general    Vitals Value Taken Time   /88 01/09/24 1615   Temp 36.2 °C (97.2 °F) 01/09/24 1550   Pulse 70 01/09/24 1615   Resp 14 01/09/24 1615   SpO2 94 % 01/09/24 1615       Anesthesia Post Evaluation    Patient location during evaluation: bedside  Patient participation: complete - patient participated  Level of consciousness: awake  Pain management: adequate  Multimodal analgesia pain management approach  Airway patency: patent  Cardiovascular status: stable  Respiratory status: spontaneous ventilation and unassisted  Hydration status: acceptable  Postoperative Nausea and Vomiting: none  Comments: No significant PONV.        No notable events documented.

## 2024-01-09 NOTE — POST-PROCEDURE NOTE
1640 Family at bedside  1650 Discharge instruction reviewed with pt and family by nurse  1755 Pt get dressed with family  1805 IV removed

## 2024-01-09 NOTE — ANESTHESIA PREPROCEDURE EVALUATION
Patient: Wellington Meredith    Procedure Information       Date/Time: 01/09/24 1300    Procedure: Laparoscopic Right Inguinal Hernia Repair; Mesh Placement (Right)    Location: [facility] A OR 05 / Virtual [facility] A OR    Surgeons: Zafar Narvaez MD            Relevant Problems   Cardiovascular   (+) Essential hypertension   (+) Thoracic aortic aneurysm (CMS/HCC)      Endocrine   (+) Class 1 obesity with body mass index (BMI) of 33.0 to 33.9 in adult      GI   (+) GERD (gastroesophageal reflux disease)      Hematology   (+) Thrombocytopenia (CMS/HCC)       Clinical information reviewed:   Tobacco  Allergies  Meds   Med Hx  Surg Hx   Fam Hx  Soc Hx        NPO Detail:  NPO/Void Status  Carbohydrate Drink Given Prior to Surgery? : N  Date of Last Liquid: 01/09/24  Time of Last Liquid: 0900  Date of Last Solid: 01/08/24  Time of Last Solid: 2200  Last Intake Type: Clear fluids  Time of Last Void: 1135         Physical Exam    Airway  Mallampati: II  TM distance: >3 FB  Neck ROM: full     Cardiovascular   Rhythm: regular  Rate: normal     Dental    Pulmonary   Breath sounds clear to auscultation     Abdominal            Anesthesia Plan    History of general anesthesia?: yes  History of complications of general anesthesia?: no    ASA 2     general     intravenous induction   Anesthetic plan and risks discussed with patient.    Plan discussed with CRNA.

## 2024-01-09 NOTE — OP NOTE
Laparoscopic Right Inguinal Hernia Repair; Mesh Placement (R), Excision Lesion Back (R) Operative Note     Date: 2024  OR Location: [facility] A OR    Name: Wellington Meredith, : 1965, Age: 58 y.o., MRN: [id number], Sex: male    Diagnosis  Pre-op Diagnosis     * Right inguinal hernia,  Right upper back cyst  Post-op Diagnosis     * Right inguinal hernia,  Right upper back cyst      Procedures  Laparoscopic Right Inguinal Hernia Repair; Mesh Placement  Excisional biopsy of back cyst          Surgeons      * Zafar Narvaez - Primary    Resident/Fellow/Other Assistant:  Surgeon(s) and Role:    Procedure Summary  Anesthesia: General  ASA: II  Anesthesia Staff: Anesthesiologist: Isaac Cardona MD  CRNA: KELL Post-CRNA  C-AA: SHUBHAM Ortez; SHUBHAM Alonzo  Estimated Blood Loss: 10 mL  Intra-op Medications: * No intraprocedure medications in log *           Anesthesia Record               Intraprocedure I/O Totals          Intake    LR infusion 1000.00 mL    Total Intake 1000 mL       Output    Urine 700 mL    Est. Blood Loss 200 mL    Total Output 900 mL       Net    Net Volume 100 mL          Specimen: No specimens collected     Staff:   Circulator: Yair Stovall RN; Cathy Louis RN  Relief Circulator: Karmen Mendieta RN  Relief Scrub: Kathi Morales  Scrub Person: Kenneth Kendrick         Drains and/or Catheters: * None in log *    Findings: small right inguinal hernia with direct and indirect components    Indications: Wellington Meredith is an 58 y.o. male who is having surgery for Unilateral inguinal hernia without obstruction or gangrene, recurrence not specified [K40.90].     The patient was seen in the preoperative area. The risks, benefits, complications, treatment options, non-operative alternatives, expected recovery and outcomes were discussed with the patient. The possibilities of reaction to medication, pulmonary aspiration, injury to surrounding structures,  bleeding, recurrent infection, the need for additional procedures, failure to diagnose a condition, and creating a complication requiring transfusion or operation were discussed with the patient. The patient concurred with the proposed plan, giving informed consent.  The site of surgery was properly noted/marked if necessary per policy. The patient has been actively warmed in preoperative area. Preoperative antibiotics have been ordered and given within 1 hours of incision. Venous thrombosis prophylaxis have been ordered including bilateral sequential compression devices     Procedure Details:  Mr. Meredith is a 58-year-old gentleman comes in for elective repair of a symptomatic right inguinal hernia.  That he would like to have excised.  Risk benefits alternatives were discussed preoperatively and he agrees to the operation.    Description of procedure:  Patient taken operating room, placed supi he also has a small epidermoid inclusion cyst involving his right upper back ne on the operating table.  Timeout was established, general anesthesia was induced.  He did receive antibiotics.  A Ball catheter was placed that was later removed after the procedure.  The abdomen and groin regions were sterilely prepared.    We made a infraumbilical midline incision and carried our dissection down to the abdominal wall fascia.  We made a transverse incision to expose the medial aspect of the right rectus muscle.  This was retracted laterally.  This nicely exposed the posterior rectus sheath.  We then passed take a Covidien balloon dissector down to the pubic bone.  We insufflated we inflated this balloon under laparoscopic visualization.  We removed our dissecting balloon and then placed a working trocar.  We established our pneumoextraperitoneum.  Two 5 mm ports were placed in midline lower abdominal wall.    We began by taking down the lateral peritoneal attachments to the right lower abdominal wall.  He was noted to have a small  to moderate sized indirect hernia defect with a small indirect component.  We very carefully reduced the hernia sac  the sac from the cord structures.  Once reduced, we introduced a right-sided 3D max mesh through our camera port and positioned this over the hernia defect.  It was secured into place using absorbable tacks.      With the mesh nicely in place, we released our pneumoextraperitoneum.  The midline fascial defect was closed using 0 Vicryl sutures placed in a running fashion.  The skin incisions were closed using 3 and 4-0 subcuticular Vicryl stitches, followed by Dermabond glue.    Patient was then placed in the decubitus position with the right side up to expose the right upper back.  We sterilely prepared this area overlying the small cyst.  We infiltrated with 1% lidocaine we made a longitudinal incision and enucleated a cyst measuring 1.2 cm.   it was handed off the field as a specimen.  We irrigated the wound and then after ensuring hemostasis we closed the skin using 4-0 subcuticular Vicryl stitch followed by Dermabond glue.    The patient tolerated the procedure without difficulty and was returned to the recovery room in stable condition       Complications:  None; patient tolerated the procedure well.    Disposition: PACU - hemodynamically stable.  Condition: stable       Attending Attestation: I was present and scrubbed for the entire procedure.    Zafar Narvaez  Phone Number: [phone]

## 2024-01-10 ASSESSMENT — PAIN SCALES - GENERAL: PAINLEVEL_OUTOF10: 0 - NO PAIN

## 2024-01-12 LAB
LABORATORY COMMENT REPORT: NORMAL
PATH REPORT.FINAL DX SPEC: NORMAL
PATH REPORT.GROSS SPEC: NORMAL
PATH REPORT.RELEVANT HX SPEC: NORMAL
PATH REPORT.TOTAL CANCER: NORMAL

## 2024-01-22 ENCOUNTER — OFFICE VISIT (OUTPATIENT)
Dept: SURGERY | Facility: CLINIC | Age: 59
End: 2024-01-22
Payer: COMMERCIAL

## 2024-01-22 VITALS — WEIGHT: 260 LBS | BODY MASS INDEX: 32.5 KG/M2

## 2024-01-22 DIAGNOSIS — Z09 POSTOPERATIVE EXAMINATION: Primary | ICD-10-CM

## 2024-01-22 PROCEDURE — 99024 POSTOP FOLLOW-UP VISIT: CPT | Performed by: SURGERY

## 2024-01-22 PROCEDURE — 1036F TOBACCO NON-USER: CPT | Performed by: SURGERY

## 2024-01-22 ASSESSMENT — PAIN SCALES - GENERAL: PAINLEVEL: 4

## 2024-01-22 NOTE — PROGRESS NOTES
Mr. Wellington Meredith is a 58 y.o. year old male who comes in today for follow-up after undergoing laparoscopic right inguinal hernia repair with mesh and excisional biopsy of a small cyst involving his right upper shoulder.  This procedure was performed on 1/9/2024.    Patient is doing very well.  He did have some bruising in the right groin that has resolved.  He still has some pain and tenderness in the area    Tolerating a regular diet, bowel movements are normal    On exam patient looks well    Incisions are healing very nicely    Surgical Pathology Exam: [id number]  Order: [id number]  Collected 1/9/2024 15:40       Status: Final result      Dx: Unilateral inguinal hernia without ob...    0 Result Notes       Component  Resulting Agency   FINAL DIAGNOSIS   A. SKIN, RIGHT UPPER BACK, EXCISION:  --EPIDERMAL INCLUSION CYST (MULTIPLE FRAGMENTS)           Impression: Doing well following laparoscopic right inguinal hernia repair with mesh, excisional biopsy of a epidermoid cyst right upper back.    Reassured benign nature the results of the biopsy    For some right groin pain reassured that this will improve with time..  Suggested NSAIDs, warm compress      Discussed increasing activity level    Follow-up with me as needed Subjective   Patient ID: Wellington Meredith is a 58 y.o. male who presents for Post-op.  HPI    Review of Systems    Objective   Physical Exam    Assessment/Plan            Zafar Narvaez MD 01/22/24 10:29 AM

## 2024-01-22 NOTE — LETTER
January 22, 2024     Zuly Elizondo MD  [address]  [facility] [address]  [place] [id number]    Patient: Wellington Meredith   YOB: 1965   Date of Visit: 1/22/2024       Dear Dr. Zuly Elizondo MD:    Thank you for referring Wellington Meredith to me for evaluation. Below are my notes for this consultation.  If you have questions, please do not hesitate to call me. I look forward to following your patient along with you.       Sincerely,     Zafar Narvaez MD      CC: No Recipients  ______________________________________________________________________________________       Mr. Wellington Meredith is a 58 y.o. year old male who comes in today for follow-up after undergoing laparoscopic right inguinal hernia repair with mesh and excisional biopsy of a small cyst involving his right upper shoulder.  This procedure was performed on 1/9/2024.    Patient is doing very well.  He did have some bruising in the right groin that has resolved.  He still has some pain and tenderness in the area    Tolerating a regular diet, bowel movements are normal    On exam patient looks well    Incisions are healing very nicely    Surgical Pathology Exam: [id number]  Order: [id number]  Collected 1/9/2024 15:40       Status: Final result      Dx: Unilateral inguinal hernia without ob...    0 Result Notes       Component  Resulting Agency   FINAL DIAGNOSIS   A. SKIN, RIGHT UPPER BACK, EXCISION:  --EPIDERMAL INCLUSION CYST (MULTIPLE FRAGMENTS)           Impression: Doing well following laparoscopic right inguinal hernia repair with mesh, excisional biopsy of a epidermoid cyst right upper back.    Reassured benign nature the results of the biopsy    For some right groin pain reassured that this will improve with time..  Suggested NSAIDs, warm compress      Discussed increasing activity level    Follow-up with me as needed Subjective  Patient ID: Wellington Meredith is a 58 y.o. male who presents for Post-op.  HPI    Review of  Systems    Objective  Physical Exam    Assessment/Plan           Zafar Narvaez MD 01/22/24 10:29 AM

## 2024-01-26 ENCOUNTER — OFFICE VISIT (OUTPATIENT)
Dept: PRIMARY CARE | Facility: CLINIC | Age: 59
End: 2024-01-26
Payer: COMMERCIAL

## 2024-01-26 ENCOUNTER — TELEPHONE (OUTPATIENT)
Dept: SLEEP MEDICINE | Facility: HOSPITAL | Age: 59
End: 2024-01-26

## 2024-01-26 VITALS
HEART RATE: 89 BPM | OXYGEN SATURATION: 95 % | WEIGHT: 271.6 LBS | DIASTOLIC BLOOD PRESSURE: 96 MMHG | BODY MASS INDEX: 33.95 KG/M2 | SYSTOLIC BLOOD PRESSURE: 140 MMHG | TEMPERATURE: 98.3 F

## 2024-01-26 DIAGNOSIS — Z00.00 WELL ADULT EXAM: ICD-10-CM

## 2024-01-26 DIAGNOSIS — D69.6 THROMBOCYTOPENIA (CMS-HCC): ICD-10-CM

## 2024-01-26 DIAGNOSIS — I10 ESSENTIAL HYPERTENSION: Primary | ICD-10-CM

## 2024-01-26 DIAGNOSIS — E66.9 CLASS 1 OBESITY WITH SERIOUS COMORBIDITY AND BODY MASS INDEX (BMI) OF 33.0 TO 33.9 IN ADULT, UNSPECIFIED OBESITY TYPE: ICD-10-CM

## 2024-01-26 DIAGNOSIS — I71.20 THORACIC AORTIC ANEURYSM WITHOUT RUPTURE, UNSPECIFIED PART (CMS-HCC): ICD-10-CM

## 2024-01-26 PROBLEM — J42 CHRONIC BRONCHITIS, UNSPECIFIED CHRONIC BRONCHITIS TYPE (MULTI): Status: RESOLVED | Noted: 2023-10-10 | Resolved: 2024-01-26

## 2024-01-26 PROCEDURE — 99214 OFFICE O/P EST MOD 30 MIN: CPT | Performed by: INTERNAL MEDICINE

## 2024-01-26 PROCEDURE — 3008F BODY MASS INDEX DOCD: CPT | Performed by: INTERNAL MEDICINE

## 2024-01-26 PROCEDURE — 1036F TOBACCO NON-USER: CPT | Performed by: INTERNAL MEDICINE

## 2024-01-26 PROCEDURE — 3077F SYST BP >= 140 MM HG: CPT | Performed by: INTERNAL MEDICINE

## 2024-01-26 PROCEDURE — 3080F DIAST BP >= 90 MM HG: CPT | Performed by: INTERNAL MEDICINE

## 2024-01-26 ASSESSMENT — PATIENT HEALTH QUESTIONNAIRE - PHQ9
SUM OF ALL RESPONSES TO PHQ9 QUESTIONS 1 AND 2: 0
1. LITTLE INTEREST OR PLEASURE IN DOING THINGS: NOT AT ALL
2. FEELING DOWN, DEPRESSED OR HOPELESS: NOT AT ALL

## 2024-01-26 ASSESSMENT — ENCOUNTER SYMPTOMS
SWEATS: 1
NECK PAIN: 1
HEADACHES: 0
HYPERTENSION: 1
SHORTNESS OF BREATH: 0
PALPITATIONS: 0
ORTHOPNEA: 0
BLURRED VISION: 0
PND: 0

## 2024-01-26 NOTE — TELEPHONE ENCOUNTER
Dear Dr. Zuly Elizondo:    Thank you for referring your patient to a  Sleep Testing center for their home sleep apnea test (HSAT).     Your patient had a failed HSAT due to short recording.     American Academy of Sleep Medicine (AASM) Guidelines typically recommend an in-center, overnight sleep test after a failed attempt at an HSAT. However, we can attempt an HSAT one more time if there are special circumstances.     Given that, would you like us to re-attempt the HSAT or would you like to order an in-lab study? If you would like an in-lab study, please specify the type of sleep study (diagnostic or split night sleep study). We will create the order and pend it to you for your signature.     Kind regards,  The  Sleep Medicine Team

## 2024-01-26 NOTE — PROGRESS NOTES
Subjective   Patient ID: Wellington Meredith is a 58 y.o. male who presents for Follow-up (Surgery with Dr Narvaez).    Hypertension  This is a recurrent problem. The current episode started more than 1 year ago. The problem has been waxing and waning since onset. The problem is controlled. Associated symptoms include malaise/fatigue, neck pain and sweats. Pertinent negatives include no anxiety, blurred vision, chest pain, headaches, orthopnea, palpitations, peripheral edema, PND or shortness of breath. Risk factors for coronary artery disease include family history, obesity and stress. There are no compliance problems.         Overall well     #1  Inguinal hernia-did not see surgeon.  Minimal discomfort at this point   #2 TAA  #3 HTN- at home 120s/90s at home    On atorvastatin.      Review of Systems   Constitutional:  Positive for malaise/fatigue.   Eyes:  Negative for blurred vision.   Respiratory:  Negative for shortness of breath.    Cardiovascular:  Negative for chest pain, palpitations, orthopnea and PND.   Musculoskeletal:  Positive for neck pain.   Neurological:  Negative for headaches.   All other systems reviewed and are negative.      Objective   BP (!) 140/96 (BP Location: Left arm, Patient Position: Sitting, BP Cuff Size: Large adult)   Pulse 89   Temp 36.8 °C (98.3 °F) (Temporal)   Wt 123 kg (271 lb 9.6 oz)   SpO2 95%   BMI 33.95 kg/m²     Physical Exam    Lab Results   Component Value Date    WBC 10.2 12/27/2023    HGB 16.2 12/27/2023    HCT 46.9 12/27/2023     12/27/2023    CHOL 187 09/25/2023    TRIG 114 09/25/2023    HDL 47.5 09/25/2023    ALT 42 09/25/2023    AST 20 09/05/2018     12/27/2023    K 4.2 12/27/2023     12/27/2023    CREATININE 1.05 12/27/2023    BUN 15 12/27/2023    CO2 28 12/27/2023    TSH 1.11 09/05/2018    PSA 0.40 03/17/2023    HGBA1C 4.9 03/17/2023     The 10-year ASCVD risk score (Jaz SMITH, et al., 2019) is: 9.8%    Values used to calculate the score:       "Age: 58 years      Sex: Male      Is Non- : No      Diabetic: No      Tobacco smoker: No      Systolic Blood Pressure: 140 mmHg      Is BP treated: No      HDL Cholesterol: 47.5 mg/dL      Total Cholesterol: 187 mg/dL     === 11/08/23 ===    CT CHEST WO IV CONTRAST    - Impression -  1.  Stable ascending aortic aneurysmal dilatation up to about 4.1 cm.  No acute findings. Stable tiny pulmonary nodule. No new or growing  pulmonary nodules      Signed by: Zafar Kelsey 11/9/2023 12:54 PM  Dictation workstation:   [id number]     Assessment/Plan   Problem List Items Addressed This Visit    None    #1 rt IH- reviewed.  Follow-up surgery   #2 thoracic aortic aneurysm- reviewed at great length. Recommend follow-up CT scan 11/24.. Reviewed need for BP control. Reviewed importance with patient  #3 mild, nonspecific mediastinal adenopathy- resolved on  follow-up CTs  #4 mild splenomegaly-  resolved on  follow-up CTs  #5 hypertension-too high.  Begin amlodipine 2.5 mg daily.  Follow-up 2 to 3 weeks.  #6 finger- resolved, follow-up hand orthopedist PRN  #7 GERD- controlled. Status post endoscopy 10/10/18. I do not have results. Patient told \"all okay \"  #8 thrombocytopenia- good on retest.  #9 family history of diabetes- excellent a1c. Diet/exercise reviewed.  Retest     #10 ?CHRISSY- +snoring, HTN, +fatigue.  ---> sleep study.   Patient states he did complete the study but we do not have results.  Will track down.  #11 lipids- labs  #12 colon polyps- 22--> 2027  #13  Hemorrhoids-reviewed.    S/p Shingrix #2/2 (2021)    "

## 2024-01-27 RX ORDER — AMLODIPINE BESYLATE 2.5 MG/1
2.5 TABLET ORAL DAILY
Qty: 30 TABLET | Refills: 5 | Status: SHIPPED | OUTPATIENT
Start: 2024-01-27 | End: 2024-07-25

## 2024-01-27 NOTE — PATIENT INSTRUCTIONS
Please begin amlodipine.  Give me an update on your blood pressure in 2 or 3 weeks.  Lets get back together in 3 months.

## 2024-01-31 ENCOUNTER — TELEPHONE (OUTPATIENT)
Dept: SLEEP MEDICINE | Facility: HOSPITAL | Age: 59
End: 2024-01-31
Payer: COMMERCIAL

## 2024-03-21 DIAGNOSIS — E78.5 HYPERLIPIDEMIA, UNSPECIFIED HYPERLIPIDEMIA TYPE: ICD-10-CM

## 2024-03-21 RX ORDER — ATORVASTATIN CALCIUM 10 MG/1
10 TABLET, FILM COATED ORAL DAILY
Qty: 30 TABLET | Refills: 5 | Status: SHIPPED | OUTPATIENT
Start: 2024-03-21

## 2024-07-23 ENCOUNTER — APPOINTMENT (OUTPATIENT)
Dept: PRIMARY CARE | Facility: CLINIC | Age: 59
End: 2024-07-23
Payer: COMMERCIAL

## 2024-10-01 DIAGNOSIS — I10 ESSENTIAL HYPERTENSION: ICD-10-CM

## 2024-10-01 RX ORDER — AMLODIPINE BESYLATE 2.5 MG/1
2.5 TABLET ORAL DAILY
Qty: 30 TABLET | Refills: 5 | Status: SHIPPED | OUTPATIENT
Start: 2024-10-01 | End: 2025-03-30

## 2024-10-08 ENCOUNTER — APPOINTMENT (OUTPATIENT)
Dept: PRIMARY CARE | Facility: CLINIC | Age: 59
End: 2024-10-08
Payer: COMMERCIAL

## 2024-11-04 ENCOUNTER — LAB (OUTPATIENT)
Dept: LAB | Facility: LAB | Age: 59
End: 2024-11-04
Payer: COMMERCIAL

## 2024-11-04 DIAGNOSIS — Z00.00 WELL ADULT EXAM: ICD-10-CM

## 2024-11-04 LAB
ALT SERPL W P-5'-P-CCNC: 44 U/L (ref 10–52)
ANION GAP SERPL CALC-SCNC: 13 MMOL/L (ref 10–20)
BUN SERPL-MCNC: 19 MG/DL (ref 6–23)
CALCIUM SERPL-MCNC: 9.4 MG/DL (ref 8.6–10.3)
CHLORIDE SERPL-SCNC: 102 MMOL/L (ref 98–107)
CHOLEST SERPL-MCNC: 187 MG/DL (ref 0–199)
CHOLESTEROL/HDL RATIO: 3.4
CO2 SERPL-SCNC: 29 MMOL/L (ref 21–32)
CREAT SERPL-MCNC: 1.05 MG/DL (ref 0.5–1.3)
EGFRCR SERPLBLD CKD-EPI 2021: 81 ML/MIN/1.73M*2
ERYTHROCYTE [DISTWIDTH] IN BLOOD BY AUTOMATED COUNT: 12.5 % (ref 11.5–14.5)
GLUCOSE SERPL-MCNC: 102 MG/DL (ref 74–99)
HCT VFR BLD AUTO: 50.9 % (ref 41–52)
HDLC SERPL-MCNC: 54.9 MG/DL
HGB BLD-MCNC: 16.9 G/DL (ref 13.5–17.5)
LDLC SERPL CALC-MCNC: 111 MG/DL
MCH RBC QN AUTO: 29.6 PG (ref 26–34)
MCHC RBC AUTO-ENTMCNC: 33.2 G/DL (ref 32–36)
MCV RBC AUTO: 89 FL (ref 80–100)
NON HDL CHOLESTEROL: 132 MG/DL (ref 0–149)
NRBC BLD-RTO: 0 /100 WBCS (ref 0–0)
PLATELET # BLD AUTO: 182 X10*3/UL (ref 150–450)
POTASSIUM SERPL-SCNC: 4.5 MMOL/L (ref 3.5–5.3)
PSA SERPL-MCNC: 0.76 NG/ML
RBC # BLD AUTO: 5.71 X10*6/UL (ref 4.5–5.9)
SODIUM SERPL-SCNC: 139 MMOL/L (ref 136–145)
TRIGL SERPL-MCNC: 106 MG/DL (ref 0–149)
VLDL: 21 MG/DL (ref 0–40)
WBC # BLD AUTO: 9.9 X10*3/UL (ref 4.4–11.3)

## 2024-11-04 PROCEDURE — 83036 HEMOGLOBIN GLYCOSYLATED A1C: CPT

## 2024-11-04 PROCEDURE — 80048 BASIC METABOLIC PNL TOTAL CA: CPT

## 2024-11-04 PROCEDURE — 84153 ASSAY OF PSA TOTAL: CPT

## 2024-11-04 PROCEDURE — 36415 COLL VENOUS BLD VENIPUNCTURE: CPT

## 2024-11-04 PROCEDURE — 84460 ALANINE AMINO (ALT) (SGPT): CPT

## 2024-11-04 PROCEDURE — 85027 COMPLETE CBC AUTOMATED: CPT

## 2024-11-04 PROCEDURE — 80061 LIPID PANEL: CPT

## 2024-11-05 LAB
EST. AVERAGE GLUCOSE BLD GHB EST-MCNC: 111 MG/DL
HBA1C MFR BLD: 5.5 %

## 2024-11-06 ENCOUNTER — APPOINTMENT (OUTPATIENT)
Dept: PRIMARY CARE | Facility: CLINIC | Age: 59
End: 2024-11-06
Payer: COMMERCIAL

## 2024-11-06 VITALS
DIASTOLIC BLOOD PRESSURE: 80 MMHG | WEIGHT: 273 LBS | SYSTOLIC BLOOD PRESSURE: 130 MMHG | HEIGHT: 75 IN | BODY MASS INDEX: 33.94 KG/M2

## 2024-11-06 DIAGNOSIS — Z00.00 WELL ADULT EXAM: Primary | ICD-10-CM

## 2024-11-06 DIAGNOSIS — E78.5 HYPERLIPIDEMIA, UNSPECIFIED HYPERLIPIDEMIA TYPE: ICD-10-CM

## 2024-11-06 DIAGNOSIS — H93.12 TINNITUS OF LEFT EAR: ICD-10-CM

## 2024-11-06 PROCEDURE — 3079F DIAST BP 80-89 MM HG: CPT | Performed by: INTERNAL MEDICINE

## 2024-11-06 PROCEDURE — 99396 PREV VISIT EST AGE 40-64: CPT | Performed by: INTERNAL MEDICINE

## 2024-11-06 PROCEDURE — 3008F BODY MASS INDEX DOCD: CPT | Performed by: INTERNAL MEDICINE

## 2024-11-06 PROCEDURE — 3075F SYST BP GE 130 - 139MM HG: CPT | Performed by: INTERNAL MEDICINE

## 2024-11-06 PROCEDURE — 1036F TOBACCO NON-USER: CPT | Performed by: INTERNAL MEDICINE

## 2024-11-06 RX ORDER — ATORVASTATIN CALCIUM 10 MG/1
10 TABLET, FILM COATED ORAL DAILY
Qty: 30 TABLET | Refills: 5 | Status: SHIPPED | OUTPATIENT
Start: 2024-11-06

## 2024-11-06 ASSESSMENT — PATIENT HEALTH QUESTIONNAIRE - PHQ9
1. LITTLE INTEREST OR PLEASURE IN DOING THINGS: NOT AT ALL
SUM OF ALL RESPONSES TO PHQ9 QUESTIONS 1 AND 2: 0
2. FEELING DOWN, DEPRESSED OR HOPELESS: NOT AT ALL

## 2024-11-06 NOTE — PROGRESS NOTES
"SUBJECTIVE:   Wellington Meredith is a 58 y.o. male presenting for his annual checkup.  Current Outpatient Medications   Medication Sig Dispense Refill    amLODIPine (Norvasc) 2.5 mg tablet Take 1 tablet (2.5 mg) by mouth once daily. 30 tablet 5    atorvastatin (Lipitor) 10 mg tablet take 1 tablet by mouth once daily 30 tablet 5    cholecalciferol (Vitamin D3) 25 MCG (1000 UT) capsule Take 1 capsule (25 mcg) by mouth once daily.      cyanocobalamin (Vitamin B-12) 50 mcg tablet       mv,[place],min/iron/folic acid/lut (COMPLETE MULTI ORAL) Multi Complete Oral Capsule      omeprazole OTC (PriLOSEC OTC) 20 mg EC tablet Take 1 tablet (20 mg) by mouth once daily as needed. Do not crush, chew, or split.      zinc gluconate 100 mg tablet       triamcinolone (Kenalog) 0.1 % cream Apply topically 2 times a day. For 10 days (Patient not taking: Reported on 11/6/2024) 30 g 0     No current facility-administered medications for this visit.     Allergies: Patient has no known allergies.     ROS:  Feeling well. No dyspnea or chest pain on exertion. No abdominal pain, change in bowel habits, black or bloody stools. No urinary tract or prostatic symptoms. No neurological complaints.    OBJECTIVE:   The patient appears well, alert, oriented x 3, in no distress.   /80   Ht 1.905 m (6' 3\")   Wt 124 kg (273 lb)   BMI 34.12 kg/m²   ENT normal.  Neck supple. No adenopathy or thyromegaly. KATE. Lungs are clear, good air entry, no wheezes, rhonchi or rales. S1 and S2 normal, no murmurs, regular rate and rhythm. Abdomen is soft without tenderness, guarding, mass or organomegaly.  Extremities show no edema, normal peripheral pulses. Neurological is normal without focal findings.    ASSESSMENT:   healthy adult male      Lab Results   Component Value Date    WBC 9.9 11/04/2024    HGB 16.9 11/04/2024    HCT 50.9 11/04/2024     11/04/2024    CHOL 187 11/04/2024    TRIG 106 11/04/2024    HDL 54.9 11/04/2024    ALT 44 11/04/2024    AST 20 " "09/05/2018     11/04/2024    K 4.5 11/04/2024     11/04/2024    CREATININE 1.05 11/04/2024    BUN 19 11/04/2024    CO2 29 11/04/2024    TSH 1.11 09/05/2018    PSA 0.76 11/04/2024    HGBA1C 5.5 11/04/2024       PLAN:   continue current healthy lifestyle patterns and return for routine annual checkups       #1 rt IH- reviewed.  Follow-up surgery   #2 thoracic aortic aneurysm- reviewed at great length. Recommend follow-up CT scan pending. Reviewed need for BP control. Reviewed importance with patient  #3 mild, nonspecific mediastinal adenopathy- resolved on  follow-up CTs  #4 mild splenomegaly-  resolved on  follow-up CTs  #5 hypertension-  Begin amlodipine 2.5 mg daily.  Follow-up 2 to 3 weeks.  #6 finger- resolved, follow-up hand orthopedist PRN  #7 GERD- controlled. Status post endoscopy 10/10/18. I do not have results. Patient told \"all okay \"  #8 thrombocytopenia- good on retest.  #9 family history of diabetes- excellent a1c. Diet/exercise reviewed.  Retest     #10 ?CHRISSY- +snoring, HTN, +fatigue.  ---> sleep study.   Patient states he did complete the study but we do not have results.  Will track down.  #11 lipids-  resume atorvastatin.    #12 colon polyps- 2022--> 2027  #13  Hemorrhoids-reviewed.    S/p Shingrix #2/2 (2021)  "

## 2024-11-11 ENCOUNTER — HOSPITAL ENCOUNTER (OUTPATIENT)
Dept: RADIOLOGY | Facility: CLINIC | Age: 59
Discharge: HOME | End: 2024-11-11
Payer: COMMERCIAL

## 2024-11-11 DIAGNOSIS — I71.20 THORACIC AORTIC ANEURYSM WITHOUT RUPTURE, UNSPECIFIED PART (CMS-HCC): ICD-10-CM

## 2024-11-11 PROCEDURE — 71250 CT THORAX DX C-: CPT | Performed by: RADIOLOGY

## 2024-11-11 PROCEDURE — 71250 CT THORAX DX C-: CPT

## 2024-11-21 ENCOUNTER — TELEPHONE (OUTPATIENT)
Dept: PRIMARY CARE | Facility: CLINIC | Age: 59
End: 2024-11-21
Payer: COMMERCIAL

## 2024-11-21 DIAGNOSIS — G47.33 OSA (OBSTRUCTIVE SLEEP APNEA): Primary | ICD-10-CM

## 2024-11-21 DIAGNOSIS — K21.9 GASTROESOPHAGEAL REFLUX DISEASE, UNSPECIFIED WHETHER ESOPHAGITIS PRESENT: ICD-10-CM

## 2024-11-21 DIAGNOSIS — R91.1 PULMONARY NODULE: ICD-10-CM

## 2024-11-21 DIAGNOSIS — Q25.79 PULMONARY ARTERY ABNORMALITY (HHS-HCC): ICD-10-CM

## 2024-11-21 DIAGNOSIS — I71.20 THORACIC AORTIC ANEURYSM WITHOUT RUPTURE, UNSPECIFIED PART (CMS-HCC): ICD-10-CM

## 2024-11-21 NOTE — TELEPHONE ENCOUNTER
Patient called, CASIE, requesting a call from you about the recent CT scan he had done.  He has some questions about the results.     He can be reached at [phone].      Please advise.

## 2024-11-21 NOTE — PROGRESS NOTES
Reviewed CT scan with patient at length.  Overall patient feels well.    #1 pulmonary nodules-read is stable.  Did note a perifissural lymph node that was new and 0.5 mm in diameter.  Patient does have remote tobacco history.  To be very complete will obtain follow-up CT scan 6 months.  Reviewed and orders.  #2?  Pulmonary hypertension-reviewed.  Will obtain sleep study as patient also describes snoring and fatigue.  Hypertension.  Reviewed importance of this with patient.  Orders entered.  Will also consult cardiology  #3 patulous esophagus-ongoing reflux issues.  Last scope 2018.  Consult GI.  Reviewed  #4 TAA-subtle.  Reviewed.  Consult cardiology.

## 2024-11-22 NOTE — PROGRESS NOTES
Subjective   Patient ID: Wellington Meredith is a 58 y.o. male who presents for Tinnitus.  HPI  This 58-year-old male is being seen in the office today for evaluation of left-sided.  He had been recently seen by his primary care physician and routine assessment was negative for any abnormalities on testing with a negative TSH normal electrolytes normal liver functions , normal cholesterol but elevated t LDL.  He does have a history of hypertension acid reflux, CT hands of the chest have been followed with very mild aortic dilatation and small pulmonary nodules noted.  He has had baseline tinnitus left greater than right with no sudden change in hearing to report.  Assessment/Plan   Review of Systems   A 12 point ROS  has been reviewed and are negative for complaint except for what is stated in the history of present illness and /or for past medical history as noted in the EMR.    Past Medical History:   Diagnosis Date    Atypical chest pain     ECHO ordered by PCP, done 9/27/23, LV function WNL    DVT (deep venous thrombosis) (Multi)     with migration to kidney, per patient etiology not determined    GERD (gastroesophageal reflux disease)     occassional OTC omeprazole    Hernia, inguinal     Right    HTN (hypertension)     improved, amlodipine d/c'd    Hyperlipidemia     Hypertension     Snoring     CHRISSY suspected, home testing ordered    Thoracic aortic aneurysm (CMS-HCC)     Thrombocytopenia (CMS-HCC)     BOC=695 3/24/23          Current Outpatient Medications:     amLODIPine (Norvasc) 2.5 mg tablet, Take 1 tablet (2.5 mg) by mouth once daily., Disp: 30 tablet, Rfl: 5    atorvastatin (Lipitor) 10 mg tablet, Take 1 tablet (10 mg) by mouth once daily., Disp: 30 tablet, Rfl: 5    cholecalciferol (Vitamin D3) 25 MCG (1000 UT) capsule, Take 1 capsule (25 mcg) by mouth once daily., Disp: , Rfl:     cyanocobalamin (Vitamin B-12) 50 mcg tablet, , Disp: , Rfl:     mv,[place],min/iron/folic acid/lut (COMPLETE MULTI ORAL), Multi  "Complete Oral Capsule, Disp: , Rfl:     omeprazole OTC (PriLOSEC OTC) 20 mg EC tablet, Take 1 tablet (20 mg) by mouth once daily as needed. Do not crush, chew, or split., Disp: , Rfl:     zinc gluconate 100 mg tablet, , Disp: , Rfl:      Family History   Problem Relation Name Age of Onset    Other (Cardiac disorder) Mother          50s +tob    Skin cancer Father      Diabetes Brother          type 1    Hypertension Brother      No Known Problems Brother      Other (Cardiac disorder) Paternal Grandfather          50s +tob    Factor V Leiden deficiency Father's Brother          Factor V Leiden mutation       Past Surgical History:   Procedure Laterality Date    FINGER SURGERY Left     5th finger tendon    TONSILLECTOMY          Social History     Tobacco Use    Smoking status: Former     Current packs/day: 0.00     Average packs/day: 1.5 packs/day for 16.0 years (24.0 ttl pk-yrs)     Types: Cigarettes     Start date:      Quit date:      Years since quittin.9    Smokeless tobacco: Never    Tobacco comments:     Very remotely   Substance Use Topics    Alcohol use: Yes     Alcohol/week: 6.0 standard drinks of alcohol     Types: 6 Cans of beer per week     Comment: beer or liquor       No Known Allergies    Height 1.905 m (6' 3\"), weight 120 kg (265 lb).    Objective   Physical Exam  Examination:    CONSTITUTIONAL: Alert, in no acute distress, normal pitch/clarity of voice, well-developed, well-nourished, cooperative.  HEAD/FACE: Normocephalic, atraumatic, no tenderness over the sinuses, facial strength and movement symmetric.    SKIN: Good turgor, no rashes, no suspicious lesions, in the head and neck.    EYES: Both eyes have normal extraocular movements with no nystagmus, pupils are equal and reactive to light and accommodation, conjunctiva is clear.    EARS: Both ears are negative for external skin abnormalities, external auditory canals are without lesions or signs of inflammation, tympanic " membranes are intact and are of normal color and texture, no effusions are seen, light reflexes normal, no mastoid tenderness is noted to palpation, objective hearing is intact.    NOSE: No external skin lesions are noted, nares are patent, septum is intact and noninflamed, nasal turbinates are normal in appearance, sinuses are nontender to palpation bilaterally, no internal lesions or polyps are noted, no discharge is noted.    OROPHARYNX/ORAL CAVITY: Mucous membranes of the oropharynx and the oral cavity proper are without lesions or ulcerations, tongue mobility is normal and no lesions are noted, gingiva and alveolar mucosa is intact without lesions, oral mucosa is moist, muscular movement of the palate and gag reflex are normal.    NASOPHARYNX: Mucous membranes are noninflamed and no secretions or lesions are noted.    LARYNX: No mucosal inflammation or exudates are noted, arytenoids are normal in appearance and mobility, false vocal cords are without lesions as is the remainder of the supraglottic larynx, true vocal folds are mobile without inflammation or obstructions and no masses or lesions are noted in the endolarynx.    NECK: No lymphadenopathy is palpated, neck is supple with full range of motion, thyroid is without swelling or tenderness, trachea is midline, no neck masses are noted.    Lymphatics: No cervical adenopathy or supraclavicular adenopathy noted to palpation.    HEART/VASCULAR: No jugular venous distention is noted, carotid pulsations are intact with a regular rate and rhythm noted,    PULMONARY: Good air movement with normal inspiratory/expiratory effort is noted, no audible wheezing is appreciated.    NEUROLOGIC: Alert and oriented, cranial nerves are grossly intact, gait is normal, sensation in the head and neck is intact,    PSYCH: oriented to person, place and time, normal mood and affect.    EXTREMITIES: No motor dysfunction of the upper and lower extremity is noted.    Audiogram on the  right-hand side revealed evidence for mild hearing loss at 250 Hz to 1000 Hz and 8000 Hz..  The remainder are low normal to be normal.  Left-hand side there is a sloping mild to severe sensorineural hearing loss from 2000 8000 Hz.  Discrimination scores are still intact at 90% on the right 100% on the left at 60 dB.  Tympanograms are normal stapedial reflexes are not measurable.  Assessment/Plan   Problem List Items Addressed This Visit    None  Visit Diagnoses         Codes    Sensorineural hearing loss (SNHL) of both ears    -  Primary H90.3    Relevant Orders    MR IAC w and wo IV contrast    Tinnitus of left ear     H93.12    Asymmetrical sensorineural hearing loss     H90.3    Relevant Orders    MR IAC w and wo IV contrast            The audiological findings were discussed with the patient. Given the asymmetry of the hearing levels, the possibility of an acoustic neuroma was discussed. Options including observation and monitoring with serial audiograms,  scheduling an ABR or MRI scan for further evaluation were discussed. The implications of finding an acoustic neuroma are also discussed. Following prolonged discussion we opted to proceed with scheduling an MRI for further evaluation. We will plan to call the patient with the results and with further recommendations.    This patient is advised to follow up with their PCP for all other health care issues and treatment. Dictation was done with dragon transcription and errors in spelling  and diction are possible.    Aniceto Ware DMD, MD 11/26/24 2:16 PM

## 2024-11-26 ENCOUNTER — APPOINTMENT (OUTPATIENT)
Dept: OTOLARYNGOLOGY | Facility: CLINIC | Age: 59
End: 2024-11-26
Payer: COMMERCIAL

## 2024-11-26 ENCOUNTER — APPOINTMENT (OUTPATIENT)
Dept: AUDIOLOGY | Facility: CLINIC | Age: 59
End: 2024-11-26
Payer: COMMERCIAL

## 2024-11-26 VITALS — HEIGHT: 75 IN | BODY MASS INDEX: 32.95 KG/M2 | WEIGHT: 265 LBS

## 2024-11-26 DIAGNOSIS — H90.3 SENSORINEURAL HEARING LOSS (SNHL) OF BOTH EARS: Primary | ICD-10-CM

## 2024-11-26 DIAGNOSIS — H93.12 TINNITUS OF LEFT EAR: ICD-10-CM

## 2024-11-26 DIAGNOSIS — H90.3 ASYMMETRICAL SENSORINEURAL HEARING LOSS: Primary | ICD-10-CM

## 2024-11-26 DIAGNOSIS — H90.3 ASYMMETRICAL SENSORINEURAL HEARING LOSS: ICD-10-CM

## 2024-11-26 PROBLEM — H10.30 CONJUNCTIVITIS, ACUTE: Status: ACTIVE | Noted: 2024-11-26

## 2024-11-26 PROBLEM — R13.10 DYSPHAGIA: Status: ACTIVE | Noted: 2024-11-26

## 2024-11-26 PROBLEM — Z20.822 COVID-19 RULED OUT: Status: ACTIVE | Noted: 2024-11-26

## 2024-11-26 PROBLEM — L71.8 ACNE ROSACEA, ERYTHEMATOUS TELANGIECTATIC TYPE: Status: ACTIVE | Noted: 2024-11-26

## 2024-11-26 PROCEDURE — 1036F TOBACCO NON-USER: CPT | Performed by: OTOLARYNGOLOGY

## 2024-11-26 PROCEDURE — 3008F BODY MASS INDEX DOCD: CPT | Performed by: OTOLARYNGOLOGY

## 2024-11-26 PROCEDURE — 99204 OFFICE O/P NEW MOD 45 MIN: CPT | Performed by: OTOLARYNGOLOGY

## 2024-11-26 PROCEDURE — 92557 COMPREHENSIVE HEARING TEST: CPT | Performed by: AUDIOLOGIST

## 2024-11-26 PROCEDURE — 92550 TYMPANOMETRY & REFLEX THRESH: CPT | Performed by: AUDIOLOGIST

## 2024-11-26 NOTE — PROGRESS NOTES
COMPREHENSIVE AUDIOMETRIC EVALUATION      Name:  Wellington Meredith  :  1965  Age:  58 y.o.  Date of Evaluation:  24   Referring Provider:  Aniceto Ware DMD, MD     History:  Mr. Meredith was seen today for an evaluation of hearing.  Patient reported left tinnitus and concern for left hearing loss.  When asked, patient denied otalgia.  NOTE: Limited case hx obtained as patient arrived to 30 min audiology appt over 20 minutes late; patient seen as courtesy to ENT provider    See audiometric evaluation at end of this report or scanned under media tab    OTOSCOPY:       Right Ear: Clear canal with monomeric portion to TM       Left Ear: Clear canal with monomeric portion to TM    226 Hz TYMPANOMETRY:       Right Ear: Type A: normal peak pressure, compliance, and ear canal volume, consistent with middle ear function within normal limits       Left Ear: Type Ad: hypercompliant with normal peak pressure and ear canal volume    AUDIOMETRIC EVALUATION (Phones):       Right Ear: Mild rising to WNL through 4000 Hz sloping to Mild, Sensorineural hearing loss                 Left Ear: Normal sloping to Profound, Sensorineural hearing loss           NOTE: Clinically significant asymmetry from 3000 - 8000 Hz    Test technique:  Standard Audiometry  Reliability:   good    SPEECH RECOGNITION THRESHOLD:       Right Ear:  10 dBHL in fair agreement with PTA       Left Ear:  15 dBHL in good agreement with PTA    WORD RECOGNITION:       Right Ear:  90%  excellent (%) at normal presentation level       Left Ear:   100%  excellent (%) at elevated presentation level    NOTE: Rollover evaluation conducted in the left ear and was NEGATIVE inconsistent with retrocochlear involvement.    IPSILATERAL ACOUSTIC REFLEXES:       Right Ear:  Absent from 500-4000 Hz       Left Ear:    Essentially absent from 500-4000 Hz    CONTRALATERAL REFLEXES:       Probe Right (Stimulus Left):  Absent from 500-4000 Hz       Probe Left  (Stimulus Right):    Absent from 500-4000 Hz    NOTE: essentially globally absent acoustic reflexes may be indicative of retrocochlear involvement    ACOUSTIC REFLEX DECAY:       Probe Right (Stimulus Left): CNT       Probe Left (Stimulus Right):CNT    DISCUSSION:   Discussed results and recommendations with patient.  Questions were addressed and the patient was encouraged to contact our department should concerns arise.    RECOMMENDATIONS:  -Recommend further investigation to rule out retrocochlear involvement due to clinically significant asymmetry and absent acoustic reflexes  -Following medical clearance, recommend patient return for hearing aid evaluation.    Claudia Adams, CCC-A     Appt: 2:15 - 2:45 PM

## 2024-12-12 ENCOUNTER — OFFICE VISIT (OUTPATIENT)
Dept: CARDIOLOGY | Facility: CLINIC | Age: 59
End: 2024-12-12
Payer: COMMERCIAL

## 2024-12-12 VITALS
BODY MASS INDEX: 33.94 KG/M2 | DIASTOLIC BLOOD PRESSURE: 77 MMHG | HEART RATE: 94 BPM | WEIGHT: 273 LBS | SYSTOLIC BLOOD PRESSURE: 124 MMHG | HEIGHT: 75 IN

## 2024-12-12 DIAGNOSIS — Q25.79 PULMONARY ARTERY ABNORMALITY (HHS-HCC): ICD-10-CM

## 2024-12-12 DIAGNOSIS — E78.2 MIXED HYPERLIPIDEMIA: Primary | ICD-10-CM

## 2024-12-12 DIAGNOSIS — I71.20 THORACIC AORTIC ANEURYSM WITHOUT RUPTURE, UNSPECIFIED PART (CMS-HCC): ICD-10-CM

## 2024-12-12 DIAGNOSIS — I10 ESSENTIAL HYPERTENSION: ICD-10-CM

## 2024-12-12 PROCEDURE — 3074F SYST BP LT 130 MM HG: CPT | Performed by: INTERNAL MEDICINE

## 2024-12-12 PROCEDURE — 1036F TOBACCO NON-USER: CPT | Performed by: INTERNAL MEDICINE

## 2024-12-12 PROCEDURE — 3078F DIAST BP <80 MM HG: CPT | Performed by: INTERNAL MEDICINE

## 2024-12-12 PROCEDURE — 3008F BODY MASS INDEX DOCD: CPT | Performed by: INTERNAL MEDICINE

## 2024-12-12 PROCEDURE — 99204 OFFICE O/P NEW MOD 45 MIN: CPT | Performed by: INTERNAL MEDICINE

## 2024-12-12 PROCEDURE — 99214 OFFICE O/P EST MOD 30 MIN: CPT | Performed by: INTERNAL MEDICINE

## 2024-12-12 NOTE — PROGRESS NOTES
Chief Complaint:   TAA     History of Present Illness     Wellington Meredith is a 58 y.o. male presenting with for evaluation of asymptomatic 4-4.1 cm ascending aortic aneurysm in 6 ft three tall man.  The patient is tolerating their anti-hypertensive medications and is compliant.  The patient is compliant with regular physically active and follows a low sodium, heart-healthy diet. The patient has been well since their last office appointment and is not having any chest pain, back pain, or dyspnea on exertion. The patient has been compliant with annual screening exams by echocardiography, chest CTA or MRA.  They do not have  bicuspid aortic valve, connective tissue disease, or family history of aortic aneurysm/dissection.    Review of Systems  All pertinent systems have been reviewed and are negative except for what is stated in the history of present illness.    All other systems have been reviewed and are negative and noncontributory to this patient's current ailments.  .       Previous History     Past Medical History:  He has a past medical history of Atypical chest pain, DVT (deep venous thrombosis) (Multi), GERD (gastroesophageal reflux disease), Hernia, inguinal, HTN (hypertension), Hyperlipidemia, Hypertension, Mixed hyperlipidemia (12/12/2024), Snoring, Thoracic aortic aneurysm (CMS-HCC), and Thrombocytopenia (CMS-[facility]).    Past Surgical History:  He has a past surgical history that includes Finger surgery (Left, 2021) and Tonsillectomy (1968).      Social History:  He reports that he quit smoking about 31 years ago. His smoking use included cigarettes. He started smoking about 47 years ago. He has a 24 pack-year smoking history. He has never used smokeless tobacco. He reports current alcohol use of about 6.0 standard drinks of alcohol per week. He reports that he does not currently use drugs.    Family History:  Family History   Problem Relation Name Age of Onset    Other (Cardiac disorder) Mother          50s  "+tob    Skin cancer Father      Diabetes Brother          type 1    Hypertension Brother      No Known Problems Brother      Other (Cardiac disorder) Paternal Grandfather          50s +tob    Factor V Leiden deficiency Father's Brother          Factor V Leiden mutation        Allergies:  Patient has no known allergies.    Outpatient Medications:  Current Outpatient Medications   Medication Instructions    amLODIPine (NORVASC) 2.5 mg, oral, Daily    atorvastatin (LIPITOR) 10 mg, oral, Daily    cholecalciferol (VITAMIN D3) 25 mcg, Daily    cyanocobalamin (Vitamin B-12) 50 mcg tablet No dose, route, or frequency recorded.    mv,[place],min/iron/folic acid/lut (COMPLETE MULTI ORAL) Multi Complete Oral Capsule    omeprazole OTC (PRILOSEC OTC) 20 mg, Daily PRN    zinc gluconate 100 mg tablet No dose, route, or frequency recorded.       Physical Examination   Vitals:  Visit Vitals  /77 (BP Location: Right arm, Patient Position: Sitting, BP Cuff Size: Large adult)   Pulse 94   Ht 1.905 m (6' 3\")   Wt 124 kg (273 lb)   BMI 34.12 kg/m²   Smoking Status Former   BSA 2.56 m²    Physical Exam  Vitals reviewed.   Constitutional:       General: He is not in acute distress.     Appearance: Normal appearance.   HENT:      Head: Normocephalic and atraumatic.      Nose: Nose normal.   Eyes:      Conjunctiva/sclera: Conjunctivae normal.   Cardiovascular:      Rate and Rhythm: Normal rate and regular rhythm.      Pulses: Normal pulses.      Heart sounds: No murmur heard.  Pulmonary:      Effort: Pulmonary effort is normal. No respiratory distress.      Breath sounds: Normal breath sounds. No wheezing, rhonchi or rales.   Abdominal:      General: Bowel sounds are normal. There is no distension.      Palpations: Abdomen is soft.      Tenderness: There is no abdominal tenderness.   Musculoskeletal:         General: No swelling.      Right lower leg: No edema.      Left lower leg: No edema.   Skin:     General: Skin is warm and dry.      " Capillary Refill: Capillary refill takes less than 2 seconds.   Neurological:      General: No focal deficit present.      Mental Status: He is alert.   Psychiatric:         Mood and Affect: Mood normal.             Labs/Imaging/Cardiac Studies   I have personally reviewed the patient's available lab work, primary care appointment notes, pertinent imaging studies, and cardiac studies and have discussed them and my independent interpretation of those results with the patient and caregiver at this appointment.  All pertinent recent Emergency Department evaluations and Hospital admissions were also reviewed in detail with the patient and caregiver.        Reviewed 2023 Echo, Labs and CT chest 11/11/24  No echocardiogram results found for the past 12 months       Assessment and Recommendations     Assessment/Plan     1. Pulmonary artery abnormality (HHS-HCC)  No PAH on 2023 Echo and size of PA is borderline with his size, gender and age.  - Referral to Cardiology    2. Thoracic aortic aneurysm without rupture, unspecified part (CMS-HCC)  Stable and asymptomatic ascending aortic aneurysm with unchanged size of the aneurysm by recent imaging.  There is no indication for surgical repair. The patient is tolerating their anti-hypertensive medications including beta blocker and/or ACE/ARB when appropriate to limit expansion and is achieving a goal blood pressure of less than 130/80.  The patient will be schedule for annual surveillance imaging.    - Referral to Cardiology    3. Mixed hyperlipidemia (Primary)  The patient's lipids are well controlled on chronic statin therapy and they are meeting their goal LDL cholesterol per the ACC/AHA guidelines.        4. Essential hypertension  The patient's blood pressure has been well-controlled at today's appointment or by recent primary care provider's measurements/home measurements and meets their goal blood pressure per the ACC/AHA guidelines.  The patient has been compliant with  their anti-hypertensive medications and is following a low sodium/DASH diet. I advised continuation of their present medical treatment and lifestyle modification.       Wellington Meredith will return in 1 year for an office visit with Echo.         Andre Burr MD    Exclusive of any other services or procedures performed, I, Andre Burr MD , spent 30 minutes in duration for this visit today.  This time consisted of chart review, obtaining history, and/or performing the exam as documented above as well as documenting the clinical information for the encounter in the electronic record, discussing treatment options, plans, and/or goals with patient, family, and/or caregiver, refilling medications, updating the electronic record, ordering medicines, lab work, imaging, referrals, and/or procedures as documented above and communicating with other [facility] professionals. I have discussed the results of laboratory, radiology, and cardiology studies with the patient and their family/caregiver.

## 2024-12-17 ENCOUNTER — HOSPITAL ENCOUNTER (OUTPATIENT)
Dept: RADIOLOGY | Facility: CLINIC | Age: 59
Discharge: HOME | End: 2024-12-17
Payer: COMMERCIAL

## 2024-12-17 PROCEDURE — 70553 MRI BRAIN STEM W/O & W/DYE: CPT | Performed by: RADIOLOGY

## 2024-12-17 PROCEDURE — 70553 MRI BRAIN STEM W/O & W/DYE: CPT

## 2024-12-17 PROCEDURE — 2550000001 HC RX 255 CONTRASTS: Performed by: OTOLARYNGOLOGY

## 2024-12-17 PROCEDURE — A9575 INJ GADOTERATE MEGLUMI 0.1ML: HCPCS | Performed by: OTOLARYNGOLOGY

## 2024-12-17 RX ORDER — GADOTERATE MEGLUMINE 376.9 MG/ML
20 INJECTION INTRAVENOUS
Status: COMPLETED | OUTPATIENT
Start: 2024-12-17 | End: 2024-12-17

## 2024-12-24 ENCOUNTER — TELEPHONE (OUTPATIENT)
Dept: OTOLARYNGOLOGY | Facility: CLINIC | Age: 59
End: 2024-12-24

## 2025-01-07 ENCOUNTER — TELEPHONE (OUTPATIENT)
Dept: SLEEP MEDICINE | Facility: HOSPITAL | Age: 60
End: 2025-01-07
Payer: COMMERCIAL

## 2025-01-07 NOTE — TELEPHONE ENCOUNTER
Patient was recently placed an order to have a home sleep study unit mailed to his home. The order was submitted to Yvonne.     Yvonne reached out to the patient and he informed them that he does not want testing. Then patient decided to move ahead with testing and then declined again.     Yvonne has cancelled the order at this time.     Please be advised.     Miriam

## 2025-03-14 ENCOUNTER — TELEMEDICINE (OUTPATIENT)
Dept: PRIMARY CARE | Facility: CLINIC | Age: 60
End: 2025-03-14
Payer: COMMERCIAL

## 2025-03-14 ENCOUNTER — APPOINTMENT (OUTPATIENT)
Dept: PRIMARY CARE | Facility: CLINIC | Age: 60
End: 2025-03-14
Payer: COMMERCIAL

## 2025-03-14 DIAGNOSIS — R05.1 ACUTE COUGH: Primary | ICD-10-CM

## 2025-03-14 PROCEDURE — 99213 OFFICE O/P EST LOW 20 MIN: CPT | Performed by: FAMILY MEDICINE

## 2025-03-14 NOTE — PROGRESS NOTES
I performed this visit using real-time telehealth tools, including an audio/video OR telephone connection between the patient listed who was located in the [facility] and myself, Penny Craft (Board certified in the [facility]).At the start of the visit, I introduced myself as Dr. Duarte and verified the patients name, , and current physical location.  If they were currently outside of the state of [place], the visit was ended and the patient was referred to alternative means for evaluation and treatment.  The patient was made aware of the limitations of the telehealth visit.  They will not be physically examined and all issues may not be appropriate for a telehealth visit.  If necessary, an in-    In preparing for this visit and writing this note, I reviewed previous electronic medical records (labs, imaging and medical charts) of the patient available in the physician portal. Significant findings which helped in decision making are recorded in this encounter charting.  All allergies were reviewed with the patient and all medications reconciled verbally with the patient at the beginning oft the visit.    Chief complaint:     Sxs started  night--wife sick first (today is Friday)  Had a cough  Tends to get bronchitis--  As of yesterday hard dry cough-- not productive  Woke up overnight and it had gotten productive--spitting up--   Used inhaler overnight for chest tightness and  wheezing  Lots of noises overnight--   Blowing out green sputum along with cough  Chest is sore from coughing-- feels worse not that it is productive  Yesterday thought was feeling better  Wife was (-) for flu and covid--  Stuffy nose--not runny nose  Sounds congested-- sinuses are full-- x 2 days--  Non smoker-- quit 30 years ago--had smoked x 15 years  Had bronchitis as a kid-- 6yrs old-- parents smoked-- 3x a year--  Tends to cough up mucous even when feels healthy--no diagnosis  No SOB this am--  Trying to use pulse  ox--87-93 in last few days--  Had covid in 2020   Has started albuterol a few times in last few days-- with wheezing-- slept ok overnight after coughing attack  Felt weak the past few days---  No fevers    All other ROS (-)    General appearance:  Vitals available from patient? no  Alert, oriented, pleasant, in no apparent distress? yes  Answers questions appropriately? yes  Eyes clear? yes  Is patient in respiratory distress? no  Throat exam: not available  Is patient coughing during consult: NO  Audible wheezing noted? : no  Pt sounds congested?:  yes  Sniffing or rhinorrhea?:  no  Psychiatric: Affect normal? yes  Other relevant physical exam:    Assessment and Plan:    Discussed with patient during visit  differential diagnosis; viral versus bacterial infection;  (if relevant); use of medications prescribed and possible side effects; appropriate over-the-counter medications; possible complications ; when to follow-up for in person evaluation.  All patient's questions were answered. Patient has good decision making capacity.  They are alert to person, place, time and situation.  Patient has the ability to communicate choice, understand information, consequences, and reason rationally.  If sent for in person care at  or ED,    I explained why Urgent in person exam was necessary and that failure to have further evaluation, and treatment today may lead to, negative outcomes, permanent disability, or even death.   If not sent for in person care today,   follow-up with your PCP in 2-3 days, sooner if not  improving.    Follow-up immediately if symptoms worsen.   If experiencing symptoms including but not limited to lethargy / chest pain / weakness / dizziness / difficulty breathing please call 911 or go to the closest emergency department for immediate care.   Limitations to telemedicine include inability to do a complete and accurate physical exam.    Any concerns regarding this were conveyed with the patient and in  person follow-up recommended if the nature of their concern/illness does not progress as anticipated during this visit.

## 2025-03-19 NOTE — PATIENT INSTRUCTIONS
"In person evaluation today    Please send me a [employer] message if you have any questions or concerns.  FOR NON URGENT questions only.  Allow up to 72 hours for response.    If you have prescription issues or other questions you can email   Gerardo Callahan  Digital Health Coordinator, at   [email or web address]     Rest and drink plenty of fluids    Tylenol and or motrin as needed for pain and fever (unless you have been told not to take these because of your personal medical history)    Discussed options and precautions (complaint specific and may include)  Viral versus bacterial infection; use of medications; possible side effects; appropriate over-the-counter medications; possible complications and /or when to follow-up.    Limitations to telemedicine include inability to do a complete and accurate physical exam.  Any concerns regarding this were conveyed with the patient and in person follow-up recommended if patient nature of illness does not progress as anticipated during this visit.  Red flags requiring in person care were discussed.     if sent for in person care at  or ED,  it was explained why and failure to have \"higher level of care\" further evaluation, and treatment today may lead, but is not limited to negative outcomes, permanent disability, or even death.     If not sent for in person care today, follow-up with your PCP in 2-3 days, sooner if not  improving.    Follow-up immediately if symptoms worsen. If experiencing symptoms including but not limited to lethargy / chest pain / weakness / dizziness / difficulty breathing please call 911 or go to the emergency department for immediate care.     All patient's questions were answered. Patient has good decision making capacity.  They are alert to person, place, time and situation.  Patient has the ability to communicate choice, understand information, consequences, and reason " rationally.    _________________________________________________________________________________________________________________  To connect with a new PCP please visit [email or web address] or call [phone]        FOR THOSE WITH SYMPTOMS OF ILLNESS/UPPER RESPIRATORY INFECTION/COVID    CDC updated Respiratory Infection guidelines:   When you have a respiratory virus, stay home and away from others (including people  you live with who are not sick) Symptoms can include fever, chills, fatigue, cough,  runny nose, and headache (and others).    You may return to work when the following 3 conditions are met:    1) No fever without the use of a fever reducer for 24 hours  2 symptoms are overall improving AND  3) symptoms are mild     When you go back to your normal activities, take added precaution over the next 5 days, such as taking additional steps for  air, hygiene, masks, physical distancing, and/or testing when you will be around other people indoors.    Keep in mind that you may still be able to spread the virus that made you sick, even if you are feeling better. You are likely to be less contagious at this time, depending on factors like how long you were sick or how sick you were.    If you develop a fever or you start to feel worse after you have gone back to normal activities, stay home and away from others again until, for at least 24 hours, both are true: your symptoms are improving overall, and you have not had a fever (and are not using fever-reducing medication). Then take added precaution for the next 5 days.    If you never had symptoms but tested positive for a respiratory virus?, you may be contagious. For the next 5 days: take added precaution, such as taking additional steps for  air, hygiene, masks, physical distancing, and/or testing when you will be around other people indoors. This is especially important to protect people with factors that increase  their risk of severe illness from respiratory viruses.  Avoid immunocompromised, elderly, pregnant women, infants etc     Over-the-counter cold and cough medications    Take Mucinex for cough, drink plenty of fluids with this medication and will help break up congestion making it easier to cough up    For cough can use honey (children ages 1 and up) in hot tea or hot water. I recommend putting this in an insulated cup and carrying it around throughout the day to sip on.  Have it at your bedside at night in case you wake up coughing.  You can also use honey cough drops (adults and older children).    Recommend nasal saline for use in children and adults.  Neti Alfredo can also be helpful.  (Never used tap water and a Neti pot.  Use distilled water.)    If you have plugged up congested ears or the feeling of fluid in your ears, you can use an over-the-counter nasal steroid spray like fluticasone (brand name Flonase) use 2 sprays into each nostril once or twice a day for 7 days.  This will help open up the eustachian tubes and allow the fluid to drain out of your ears.    Sleeping with your head/chest elevated can help with sinus drainage.    Adults only-can use nasal decongestant (like Afrin) at bedtime to open nasal passages so you can breathe through your nose while you sleep; avoid using for longer than 3 days as this medication can become addicting.  Do not use if you have high blood pressure or high heart rate.    For severe pain or fever in adult-Tylenol (2 extra strength) or ibuprofen (3-4 tabs equals 600 to 800 mg) alternating as needed for pain.  Tylenol doses should be 6 to 8 hours apart and ibuprofen doses should be 6 to 8 hours apart.      Common cold medications for adults explained:    **Cold medications for children are not recommended-only Tylenol, Motrin, honey (only for children older than 1 year), cool-mist humidifier, and nasal saline spray are recommended for children.    Mucinex-(generic name  guaifenesin)-is an expectorant.  This will thin out all the thick mucus.  Must drink plenty of liquids for this medicine to work.    Dextromethorphan (brand name Delsym or DM)-this medicine is a cough suppressant--caution/avoid use if taking SSRI medication because of risk of Serotonin Syndrome    Honey in hot water or tea-this is a natural cough suppressant    Decongestant nasal spray- (eg: Afrin) use for temporary relief of nasal congestion-best when used at bedtime to open up nasal passages so that you are not forced to mouth breathe overnight.    Sudafed (generic name pseudoephedrine-this must be bought from the pharmacist) DO NOT use this medicine if you have high blood pressure as it can raise your blood pressure higher.  Do not use if you have any irregular heart rate.  This medicine can help clear congestion in your sinuses.

## 2025-05-06 ENCOUNTER — APPOINTMENT (OUTPATIENT)
Dept: PRIMARY CARE | Facility: CLINIC | Age: 60
End: 2025-05-06
Payer: COMMERCIAL

## 2025-06-24 DIAGNOSIS — I10 ESSENTIAL HYPERTENSION: ICD-10-CM

## 2025-06-24 RX ORDER — AMLODIPINE BESYLATE 2.5 MG/1
2.5 TABLET ORAL DAILY
Qty: 90 TABLET | Refills: 1 | Status: SHIPPED | OUTPATIENT
Start: 2025-06-24

## 2025-10-07 ENCOUNTER — APPOINTMENT (OUTPATIENT)
Dept: PRIMARY CARE | Facility: CLINIC | Age: 60
End: 2025-10-07
Payer: COMMERCIAL

## 2025-11-24 ENCOUNTER — APPOINTMENT (OUTPATIENT)
Dept: PRIMARY CARE | Facility: CLINIC | Age: 60
End: 2025-11-24
Payer: COMMERCIAL

## (undated) DEVICE — HEMOSTAT, ARISTA, ABSORBABLE, 3 GRAMS

## (undated) DEVICE — PENCIL, SMOKE EVACUATION, VALLEYLAB

## (undated) DEVICE — GLOVE, SURGICAL, PROTEXIS PI MICRO, 7.5, PF, LF

## (undated) DEVICE — SHEARS, CURVED 5MM, ENDOPATH

## (undated) DEVICE — SCOPE WARMER, LAPAROSCOPE, BAG ONLY, LF

## (undated) DEVICE — DRAPE, SHEET, ENDOSCOPY, GENERAL, FENESTRATED, ARMBOARD COVER, 98 X 123.5 IN, DISPOSABLE, LF, STERILE

## (undated) DEVICE — PUMP, STRYKERFLOW 2 & HANDPIECE W/10FT. IRRIGATION TUBING

## (undated) DEVICE — FIXATION SYSTEM, OPTIFIX, 15 FASTENERS

## (undated) DEVICE — SYRINGE, 12 CC, LUER LOCK, CONTROL, MONOJECT

## (undated) DEVICE — CANNULA, KII ADVANCED FIXATION, 5X100MM W/SEAL

## (undated) DEVICE — GLOVE, SURGICAL, PROTEXIS PI W/NEU-THERA, 8.0, PF, LF

## (undated) DEVICE — TUBESET, HIGH FLOW II, 45 LITER PNEUMO SURE, DISP.

## (undated) DEVICE — SALINE NORMAL (100/PK)

## (undated) DEVICE — SUTURE, VICRYL, 3-0, 27 IN, SH

## (undated) DEVICE — SUTURE, MONOCRYL, 4-0, 18 IN, PS2, UNDYED

## (undated) DEVICE — APPLICATOR, ARISTA, FLEXITIP, XL, LF

## (undated) DEVICE — ELECTRODE, ELECTROSURGICAL, BLADE, INSULATED, ENT/IMA, STERILE

## (undated) DEVICE — SUTURE, VICRYL, 0, 27 IN, UR-6, VIOLET